# Patient Record
Sex: MALE | Race: OTHER | Employment: OTHER | URBAN - METROPOLITAN AREA
[De-identification: names, ages, dates, MRNs, and addresses within clinical notes are randomized per-mention and may not be internally consistent; named-entity substitution may affect disease eponyms.]

---

## 2024-05-21 ENCOUNTER — TELEPHONE (OUTPATIENT)
Dept: ORTHOPEDIC SURGERY | Age: 69
End: 2024-05-21

## 2024-05-21 ENCOUNTER — OFFICE VISIT (OUTPATIENT)
Dept: ORTHOPEDIC SURGERY | Age: 69
End: 2024-05-21
Payer: MEDICARE

## 2024-05-21 DIAGNOSIS — M21.611 BUNION OF GREAT TOE OF RIGHT FOOT: ICD-10-CM

## 2024-05-21 DIAGNOSIS — M21.621 TAILOR'S BUNIONETTE, RIGHT: ICD-10-CM

## 2024-05-21 DIAGNOSIS — M21.621 TAILOR'S BUNION OF RIGHT FOOT: Primary | ICD-10-CM

## 2024-05-21 PROBLEM — I48.0 PAROXYSMAL ATRIAL FIBRILLATION (HCC): Status: ACTIVE | Noted: 2017-09-04

## 2024-05-21 PROBLEM — R78.81 BACTEREMIA DUE TO STREPTOCOCCUS PNEUMONIAE: Status: ACTIVE | Noted: 2017-09-04

## 2024-05-21 PROBLEM — B95.3 BACTEREMIA DUE TO STREPTOCOCCUS PNEUMONIAE: Status: ACTIVE | Noted: 2017-09-04

## 2024-05-21 PROBLEM — R03.0 FINDING OF ABOVE NORMAL BLOOD PRESSURE: Status: ACTIVE | Noted: 2017-09-04

## 2024-05-21 PROCEDURE — G8427 DOCREV CUR MEDS BY ELIG CLIN: HCPCS | Performed by: ORTHOPAEDIC SURGERY

## 2024-05-21 PROCEDURE — G8421 BMI NOT CALCULATED: HCPCS | Performed by: ORTHOPAEDIC SURGERY

## 2024-05-21 PROCEDURE — 4004F PT TOBACCO SCREEN RCVD TLK: CPT | Performed by: ORTHOPAEDIC SURGERY

## 2024-05-21 PROCEDURE — 3017F COLORECTAL CA SCREEN DOC REV: CPT | Performed by: ORTHOPAEDIC SURGERY

## 2024-05-21 PROCEDURE — 99204 OFFICE O/P NEW MOD 45 MIN: CPT | Performed by: ORTHOPAEDIC SURGERY

## 2024-05-21 PROCEDURE — 1123F ACP DISCUSS/DSCN MKR DOCD: CPT | Performed by: ORTHOPAEDIC SURGERY

## 2024-05-21 PROCEDURE — M5009 MISC POST OP SHOE: HCPCS | Performed by: ORTHOPAEDIC SURGERY

## 2024-05-21 RX ORDER — ASPIRIN 81 MG/1
81 TABLET ORAL DAILY
COMMUNITY
Start: 2017-09-07

## 2024-05-21 RX ORDER — LOSARTAN POTASSIUM 50 MG/1
TABLET ORAL
COMMUNITY
Start: 2024-05-02

## 2024-05-21 RX ORDER — AMLODIPINE BESYLATE 10 MG/1
TABLET ORAL
COMMUNITY
Start: 2024-05-02

## 2024-05-21 RX ORDER — DARUNAVIR 800 MG/1
1 TABLET, FILM COATED ORAL DAILY
COMMUNITY
Start: 2024-02-28

## 2024-05-21 RX ORDER — ELVITEGRAVIR, COBICISTAT, EMTRICITABINE, AND TENOFOVIR ALAFENAMIDE 150; 150; 200; 10 MG/1; MG/1; MG/1; MG/1
1 TABLET ORAL DAILY
COMMUNITY

## 2024-05-21 NOTE — TELEPHONE ENCOUNTER
Pt would like to push his sx back until August. Patient says he is going OOT in July. Please callback

## 2024-05-21 NOTE — PROGRESS NOTES
Name: Corky Bradshaw  YOB: 1955  Gender: male  MRN: [id number]    Summary: bilateral bunionette and right hallux valgus:    Patient to proceed with surgery. Consent to read: right minimally invasive bunionectomy and bunionette    HIV positive    Popliteal saphenous block     CC: bilateral lateral forefoot pain/tenderness    HPI: Corky Bradshaw is a 68 y.o. male Who presents today with lateral forefoot pain and tenderness.  They point over the metatarsal head stating that it is tender to touch.  Shoe wear aggravates that, along with long periods of ambulation.  They deny any acute injuries or prior surgeries.  This is a chronic problem that has slowly become worse over time.  Patient states that he is also having significant bunion pain on his right foot.  He states that his right foot is bothering him more than the left at this time.  He has been using toe spacers for the bunion and bunionette's and would like to discuss his surgical options at this time.    ROS/Meds/PSH/PMH/FH/SH:   Patient Denies fever/chills, headache, visual changes, chest pain, shortness of breath, and nausea/vomiting/diarrhea   Tobacco:  has no history on file for tobacco use.  Diabetes: none  Other: none    Physical Examination:  Gen: AAOx3 NAD  Resp: CTAB - no wheezing  Card: RRR  Eyes: sclera non icteric    right lower and left lower: FROM actively of toes, foot, ankle, knee and hip. No instability of foot or ankle with drawer and stress.  5/5 strength to TA/EHL/GSC/Peroneals/PTib. No skin lesions, There is some TTP over the 5th MT head.  There is a painful palpable bursa in this area over the 5th MT head.    On the right lower extremity: There is valgus alignment of the 1st MTP joint.  The medial eminence is prominent and TTP    normal silverskoid exam: With the hindfoot in neutral and forefoot supinated there is good ankle dorsiflexion with the knee flexed and extended.   Neutral hindfoot alignment.  No cavovarus nor

## 2024-05-22 NOTE — PROGRESS NOTES
The patient was prescribed and fitted with a post op shoe for the right foot, size medium.     Patient read and signed documenting they understand and agree to [facility]'s current DME return policy.

## 2024-05-23 ENCOUNTER — TELEPHONE (OUTPATIENT)
Dept: ORTHOPEDIC SURGERY | Age: 69
End: 2024-05-23

## 2024-07-16 ENCOUNTER — TELEPHONE (OUTPATIENT)
Dept: ORTHOPEDIC SURGERY | Age: 69
End: 2024-07-16

## 2024-10-01 ENCOUNTER — HOSPITAL ENCOUNTER (OUTPATIENT)
Dept: PET IMAGING | Age: 69
Discharge: HOME | End: 2024-10-04
Payer: MEDICARE

## 2024-10-01 DIAGNOSIS — C76.0 MALIGNANT NEOPLASM OF HEAD, FACE, AND NECK (HCC): ICD-10-CM

## 2024-10-01 LAB
GLUCOSE BLD STRIP.AUTO-MCNC: 97 MG/DL (ref 65–100)
SERVICE CMNT-IMP: NORMAL

## 2024-10-01 PROCEDURE — A9609 HC RX DIAGNOSTIC RADIOPHARMACEUTICAL: HCPCS

## 2024-10-01 PROCEDURE — 3430000000 HC RX DIAGNOSTIC RADIOPHARMACEUTICAL

## 2024-10-01 PROCEDURE — 2580000003 HC RX 258

## 2024-10-01 PROCEDURE — 82962 GLUCOSE BLOOD TEST: CPT

## 2024-10-01 PROCEDURE — 6360000004 HC RX CONTRAST MEDICATION

## 2024-10-01 PROCEDURE — 78815 PET IMAGE W/CT SKULL-THIGH: CPT

## 2024-10-01 RX ORDER — SODIUM CHLORIDE 0.9 % (FLUSH) 0.9 %
20 SYRINGE (ML) INJECTION AS NEEDED
Status: DISCONTINUED | OUTPATIENT
Start: 2024-10-01 | End: 2024-10-05 | Stop reason: HOSPADM

## 2024-10-01 RX ORDER — FLUDEOXYGLUCOSE F 18 200 MCI/ML
13.68 INJECTION, SOLUTION INTRAVENOUS
Status: COMPLETED | OUTPATIENT
Start: 2024-10-01 | End: 2024-10-01

## 2024-10-01 RX ORDER — DIATRIZOATE MEGLUMINE AND DIATRIZOATE SODIUM 660; 100 MG/ML; MG/ML
10 SOLUTION ORAL; RECTAL
Status: DISCONTINUED | OUTPATIENT
Start: 2024-10-01 | End: 2024-10-05 | Stop reason: HOSPADM

## 2024-10-01 RX ADMIN — FLUDEOXYGLUCOSE F 18 13.68 MILLICURIE: 200 INJECTION, SOLUTION INTRAVENOUS at 10:17

## 2024-10-01 RX ADMIN — SODIUM CHLORIDE, PRESERVATIVE FREE 20 ML: 5 INJECTION INTRAVENOUS at 10:17

## 2024-10-01 RX ADMIN — DIATRIZOATE MEGLUMINE AND DIATRIZOATE SODIUM 10 ML: 660; 100 LIQUID ORAL; RECTAL at 10:17

## 2024-10-07 ENCOUNTER — TRANSCRIBE ORDERS (OUTPATIENT)
Dept: PHYSICAL THERAPY | Age: 69
End: 2024-10-07

## 2024-10-07 DIAGNOSIS — R13.10 DYSPHAGIA, UNSPECIFIED TYPE: Primary | ICD-10-CM

## 2024-10-08 ENCOUNTER — HOSPITAL ENCOUNTER (OUTPATIENT)
Dept: GENERAL RADIOLOGY | Age: 69
Discharge: HOME | End: 2024-10-11
Payer: MEDICARE

## 2024-10-08 DIAGNOSIS — R13.10 DYSPHAGIA, UNSPECIFIED TYPE: ICD-10-CM

## 2024-10-08 PROCEDURE — 92611 MOTION FLUOROSCOPY/SWALLOW: CPT

## 2024-10-08 PROCEDURE — 74230 X-RAY XM SWLNG FUNCJ C+: CPT

## 2024-10-08 PROCEDURE — 2500000003 HC RX 250 WO HCPCS: Performed by: PHYSICIAN ASSISTANT

## 2024-10-08 RX ADMIN — BARIUM SULFATE 30 ML: 0.81 POWDER, FOR SUSPENSION ORAL at 09:18

## 2024-10-08 RX ADMIN — BARIUM SULFATE 15 ML: 400 PASTE ORAL at 09:18

## 2024-10-08 NOTE — THERAPY EVALUATION
Corky Bradshaw  : 1955  Primary: Medicare Part A And B  Secondary:   MRN: [id number] [facility] RADIOLOGY  [address] [place] [address]  Phone: [phone]    Visit Info:    Date 10/8/2024   SPEECH LANGUAGE PATHOLOGY:   MODIFIED BARIUM SWALLOW STUDY     Appt Desk   Episode      Treatment Diagnosis:    Dysphagia, unspecified type    Medical/Referring Diagnosis:  Dysphagia, unspecified type [R13.10]  Referring Physician:  Caroline Rios PA  Radiologist: Dr. Cardoso  Fluoroscopy completed by: JENNY Hatch MD Orders: Modified Barium Swallow  Date of Onset:  No data recorded   Allergies:  Efavirenz    PAST MEDICAL HISTORY:   Mr. Bradshaw is a 69 y.o. male who  has no past medical history on file.  He also  has no past surgical history on file.  Recently diagnosed with R BOT scca.  Plan for treatment: composite resection, hemiglossectomy, FOM resection, R tonsillectomy, pharyngectomy, RND, possible LND, trach, NGTF (possible PEG), RFF vs ALT flap reconstruction by Angie on 10/24/24 at [facility]    ASSESSMENT/PLAN OF CARE   Based on the objective data described below, Mr. Bradshaw presents with moderate odynophagia resulting in incoordinated swallow function. There is delayed oral transit with lingual pumping with all textures. Patient uses spontaneous head tilt to left with larger bolus of thin liquids resulting in transient laryngeal penetration. Mild vallecular residue after all swallows regardless of consistency that clears with cued double swallow. No aspiration observed.    RECOMMENDATIONS AND PLANNED INTERVENTIONS:  DIET:   pureed as needed for pain. Patient is able to masticate soft solids, but with increasing pain  thin liquids by single sip  MEDICATIONS: as tolerated    Compensatory Swallowing Strategies/Modifications:  Alternate liquids/solids  Small bites and sips  Slow rate of PO intake  Double swallow  Remain upright for 20-30 min after any PO    Additional

## 2024-11-07 PROBLEM — C02.9 PRIMARY TONGUE SQUAMOUS CELL CARCINOMA (HCC): Status: ACTIVE | Noted: 2024-11-07

## 2024-12-02 ENCOUNTER — APPOINTMENT (OUTPATIENT)
Dept: CT IMAGING | Age: 69
DRG: 975 | End: 2024-12-02
Payer: MEDICARE

## 2024-12-02 ENCOUNTER — HOSPITAL ENCOUNTER (INPATIENT)
Age: 69
LOS: 4 days | Discharge: HOME HEALTH | DRG: 975 | End: 2024-12-06
Attending: STUDENT IN AN ORGANIZED HEALTH CARE EDUCATION/TRAINING PROGRAM | Admitting: FAMILY MEDICINE
Payer: MEDICARE

## 2024-12-02 ENCOUNTER — APPOINTMENT (OUTPATIENT)
Dept: GENERAL RADIOLOGY | Age: 69
DRG: 975 | End: 2024-12-02
Payer: MEDICARE

## 2024-12-02 DIAGNOSIS — M54.50 MIDLINE LOW BACK PAIN WITHOUT SCIATICA, UNSPECIFIED CHRONICITY: ICD-10-CM

## 2024-12-02 DIAGNOSIS — A41.9 SEPTICEMIA (HCC): Primary | ICD-10-CM

## 2024-12-02 PROBLEM — E87.1 HYPONATREMIA: Status: ACTIVE | Noted: 2024-12-02

## 2024-12-02 PROBLEM — E87.20 LACTIC ACIDOSIS: Status: ACTIVE | Noted: 2024-12-02

## 2024-12-02 PROBLEM — N39.0 UTI (URINARY TRACT INFECTION): Status: ACTIVE | Noted: 2024-12-02

## 2024-12-02 PROBLEM — D72.829 LEUKOCYTOSIS: Status: ACTIVE | Noted: 2024-12-02

## 2024-12-02 PROBLEM — D64.9 NORMOCYTIC ANEMIA: Status: ACTIVE | Noted: 2024-12-02

## 2024-12-02 LAB
ALBUMIN SERPL-MCNC: 2.9 G/DL (ref 3.2–4.6)
ALBUMIN/GLOB SERPL: 0.6 (ref 1–1.9)
ALP SERPL-CCNC: 106 U/L (ref 40–129)
ALT SERPL-CCNC: 31 U/L (ref 8–55)
ANION GAP SERPL CALC-SCNC: 24 MMOL/L (ref 7–16)
APPEARANCE UR: ABNORMAL
AST SERPL-CCNC: 35 U/L (ref 15–37)
BACTERIA URNS QL MICRO: ABNORMAL /HPF
BASOPHILS # BLD: 0 K/UL (ref 0–0.2)
BASOPHILS NFR BLD: 0 % (ref 0–2)
BILIRUB SERPL-MCNC: 0.5 MG/DL (ref 0–1.2)
BILIRUB UR QL: NEGATIVE
BUN SERPL-MCNC: 22 MG/DL (ref 8–23)
CALCIUM SERPL-MCNC: 9.2 MG/DL (ref 8.8–10.2)
CASTS URNS QL MICRO: ABNORMAL /LPF
CHLORIDE SERPL-SCNC: 87 MMOL/L (ref 98–107)
CO2 SERPL-SCNC: 21 MMOL/L (ref 20–29)
COLOR UR: ABNORMAL
CREAT SERPL-MCNC: 0.99 MG/DL (ref 0.8–1.3)
DIFFERENTIAL METHOD BLD: ABNORMAL
EOSINOPHIL # BLD: 0 K/UL (ref 0–0.8)
EOSINOPHIL NFR BLD: 0 % (ref 0.5–7.8)
EPI CELLS #/AREA URNS HPF: ABNORMAL /HPF
ERYTHROCYTE [DISTWIDTH] IN BLOOD BY AUTOMATED COUNT: 14.6 % (ref 11.9–14.6)
GLOBULIN SER CALC-MCNC: 4.7 G/DL (ref 2.3–3.5)
GLUCOSE SERPL-MCNC: 127 MG/DL (ref 70–99)
GLUCOSE UR STRIP.AUTO-MCNC: NEGATIVE MG/DL
HCT VFR BLD AUTO: 29.5 % (ref 41.1–50.3)
HGB BLD-MCNC: 9.8 G/DL (ref 13.6–17.2)
HGB UR QL STRIP: NEGATIVE
IMM GRANULOCYTES # BLD AUTO: 0.1 K/UL (ref 0–0.5)
IMM GRANULOCYTES NFR BLD AUTO: 1 % (ref 0–5)
KETONES UR QL STRIP.AUTO: ABNORMAL MG/DL
LACTATE SERPL-SCNC: 2.2 MMOL/L (ref 0.5–2)
LACTATE SERPL-SCNC: 9.7 MMOL/L (ref 0.5–2)
LEUKOCYTE ESTERASE UR QL STRIP.AUTO: ABNORMAL
LYMPHOCYTES # BLD: 2 K/UL (ref 0.5–4.6)
LYMPHOCYTES NFR BLD: 13 % (ref 13–44)
MCH RBC QN AUTO: 27.4 PG (ref 26.1–32.9)
MCHC RBC AUTO-ENTMCNC: 33.2 G/DL (ref 31.4–35)
MCV RBC AUTO: 82.4 FL (ref 82–102)
MONOCYTES # BLD: 0.7 K/UL (ref 0.1–1.3)
MONOCYTES NFR BLD: 5 % (ref 4–12)
NEUTS SEG # BLD: 12.8 K/UL (ref 1.7–8.2)
NEUTS SEG NFR BLD: 82 % (ref 43–78)
NITRITE UR QL STRIP.AUTO: NEGATIVE
NRBC # BLD: 0 K/UL (ref 0–0.2)
PH UR STRIP: 5.5 (ref 5–9)
PLATELET # BLD AUTO: 446 K/UL (ref 150–450)
PMV BLD AUTO: 9.1 FL (ref 9.4–12.3)
POTASSIUM SERPL-SCNC: 4.5 MMOL/L (ref 3.5–5.1)
PROCALCITONIN SERPL-MCNC: 0.45 NG/ML (ref 0–0.1)
PROT SERPL-MCNC: 7.5 G/DL (ref 6.3–8.2)
PROT UR STRIP-MCNC: 100 MG/DL
RBC # BLD AUTO: 3.58 M/UL (ref 4.23–5.6)
SARS-COV-2 RDRP RESP QL NAA+PROBE: NOT DETECTED
SODIUM SERPL-SCNC: 132 MMOL/L (ref 136–145)
SOURCE: NORMAL
SP GR UR REFRACTOMETRY: 1.03 (ref 1–1.02)
UROBILINOGEN UR QL STRIP.AUTO: 1 EU/DL (ref 0.2–1)
WBC # BLD AUTO: 15.7 K/UL (ref 4.3–11.1)
WBC URNS QL MICRO: ABNORMAL /HPF

## 2024-12-02 PROCEDURE — 71260 CT THORAX DX C+: CPT

## 2024-12-02 PROCEDURE — 70491 CT SOFT TISSUE NECK W/DYE: CPT

## 2024-12-02 PROCEDURE — 6360000002 HC RX W HCPCS: Performed by: STUDENT IN AN ORGANIZED HEALTH CARE EDUCATION/TRAINING PROGRAM

## 2024-12-02 PROCEDURE — 85025 COMPLETE CBC W/AUTO DIFF WBC: CPT

## 2024-12-02 PROCEDURE — 81001 URINALYSIS AUTO W/SCOPE: CPT

## 2024-12-02 PROCEDURE — 80053 COMPREHEN METABOLIC PANEL: CPT

## 2024-12-02 PROCEDURE — 99285 EMERGENCY DEPT VISIT HI MDM: CPT

## 2024-12-02 PROCEDURE — 96375 TX/PRO/DX INJ NEW DRUG ADDON: CPT

## 2024-12-02 PROCEDURE — 87086 URINE CULTURE/COLONY COUNT: CPT

## 2024-12-02 PROCEDURE — 84145 PROCALCITONIN (PCT): CPT

## 2024-12-02 PROCEDURE — 87040 BLOOD CULTURE FOR BACTERIA: CPT

## 2024-12-02 PROCEDURE — 93005 ELECTROCARDIOGRAM TRACING: CPT | Performed by: STUDENT IN AN ORGANIZED HEALTH CARE EDUCATION/TRAINING PROGRAM

## 2024-12-02 PROCEDURE — 2580000003 HC RX 258: Performed by: PHYSICIAN ASSISTANT

## 2024-12-02 PROCEDURE — 96365 THER/PROPH/DIAG IV INF INIT: CPT

## 2024-12-02 PROCEDURE — 87154 CUL TYP ID BLD PTHGN 6+ TRGT: CPT

## 2024-12-02 PROCEDURE — 6370000000 HC RX 637 (ALT 250 FOR IP): Performed by: PHYSICIAN ASSISTANT

## 2024-12-02 PROCEDURE — 83605 ASSAY OF LACTIC ACID: CPT

## 2024-12-02 PROCEDURE — 6360000004 HC RX CONTRAST MEDICATION: Performed by: PHYSICIAN ASSISTANT

## 2024-12-02 PROCEDURE — 1100000000 HC RM PRIVATE

## 2024-12-02 PROCEDURE — 87635 SARS-COV-2 COVID-19 AMP PRB: CPT

## 2024-12-02 PROCEDURE — 71046 X-RAY EXAM CHEST 2 VIEWS: CPT

## 2024-12-02 PROCEDURE — 2580000003 HC RX 258: Performed by: STUDENT IN AN ORGANIZED HEALTH CARE EDUCATION/TRAINING PROGRAM

## 2024-12-02 PROCEDURE — 87205 SMEAR GRAM STAIN: CPT

## 2024-12-02 PROCEDURE — 96361 HYDRATE IV INFUSION ADD-ON: CPT

## 2024-12-02 RX ORDER — MAGNESIUM SULFATE IN WATER 40 MG/ML
2000 INJECTION, SOLUTION INTRAVENOUS PRN
Status: DISCONTINUED | OUTPATIENT
Start: 2024-12-02 | End: 2024-12-06 | Stop reason: HOSPADM

## 2024-12-02 RX ORDER — ACETAMINOPHEN 650 MG/1
650 SUPPOSITORY RECTAL EVERY 6 HOURS PRN
Status: DISCONTINUED | OUTPATIENT
Start: 2024-12-02 | End: 2024-12-06 | Stop reason: HOSPADM

## 2024-12-02 RX ORDER — SODIUM CHLORIDE, SODIUM LACTATE, POTASSIUM CHLORIDE, AND CALCIUM CHLORIDE .6; .31; .03; .02 G/100ML; G/100ML; G/100ML; G/100ML
1000 INJECTION, SOLUTION INTRAVENOUS
Status: COMPLETED | OUTPATIENT
Start: 2024-12-02 | End: 2024-12-02

## 2024-12-02 RX ORDER — ENOXAPARIN SODIUM 100 MG/ML
40 INJECTION SUBCUTANEOUS DAILY
Status: DISCONTINUED | OUTPATIENT
Start: 2024-12-03 | End: 2024-12-06 | Stop reason: HOSPADM

## 2024-12-02 RX ORDER — GABAPENTIN 100 MG/1
100 CAPSULE ORAL 2 TIMES DAILY
Status: DISCONTINUED | OUTPATIENT
Start: 2024-12-02 | End: 2024-12-03

## 2024-12-02 RX ORDER — SULFAMETHOXAZOLE AND TRIMETHOPRIM 200; 40 MG/5ML; MG/5ML
160 SUSPENSION ORAL
Status: DISCONTINUED | OUTPATIENT
Start: 2024-12-04 | End: 2024-12-03 | Stop reason: SDUPTHER

## 2024-12-02 RX ORDER — DARUNAVIR 800 MG/1
1 TABLET, FILM COATED ORAL DAILY
Status: DISCONTINUED | OUTPATIENT
Start: 2024-12-03 | End: 2024-12-03

## 2024-12-02 RX ORDER — LINEZOLID 2 MG/ML
600 INJECTION, SOLUTION INTRAVENOUS
Status: COMPLETED | OUTPATIENT
Start: 2024-12-02 | End: 2024-12-02

## 2024-12-02 RX ORDER — ATORVASTATIN CALCIUM 40 MG/1
40 TABLET, FILM COATED ORAL DAILY
Status: DISCONTINUED | OUTPATIENT
Start: 2024-12-03 | End: 2024-12-03

## 2024-12-02 RX ORDER — ACETAMINOPHEN 160 MG/5ML
975 SUSPENSION ORAL
Status: COMPLETED | OUTPATIENT
Start: 2024-12-02 | End: 2024-12-02

## 2024-12-02 RX ORDER — 0.9 % SODIUM CHLORIDE 0.9 %
1000 INTRAVENOUS SOLUTION INTRAVENOUS
Status: COMPLETED | OUTPATIENT
Start: 2024-12-02 | End: 2024-12-02

## 2024-12-02 RX ORDER — ASPIRIN 81 MG/1
81 TABLET ORAL DAILY
Status: DISCONTINUED | OUTPATIENT
Start: 2024-12-03 | End: 2024-12-03

## 2024-12-02 RX ORDER — IOPAMIDOL 755 MG/ML
100 INJECTION, SOLUTION INTRAVASCULAR
Status: COMPLETED | OUTPATIENT
Start: 2024-12-02 | End: 2024-12-02

## 2024-12-02 RX ORDER — SODIUM CHLORIDE 9 MG/ML
INJECTION, SOLUTION INTRAVENOUS PRN
Status: DISCONTINUED | OUTPATIENT
Start: 2024-12-02 | End: 2024-12-06 | Stop reason: HOSPADM

## 2024-12-02 RX ORDER — LOSARTAN POTASSIUM 50 MG/1
50 TABLET ORAL DAILY
Status: DISCONTINUED | OUTPATIENT
Start: 2024-12-03 | End: 2024-12-03

## 2024-12-02 RX ORDER — POTASSIUM CHLORIDE 1500 MG/1
40 TABLET, EXTENDED RELEASE ORAL PRN
Status: DISCONTINUED | OUTPATIENT
Start: 2024-12-02 | End: 2024-12-03

## 2024-12-02 RX ORDER — POLYETHYLENE GLYCOL 3350 17 G/17G
17 POWDER, FOR SOLUTION ORAL DAILY PRN
Status: DISCONTINUED | OUTPATIENT
Start: 2024-12-02 | End: 2024-12-06 | Stop reason: HOSPADM

## 2024-12-02 RX ORDER — POTASSIUM CHLORIDE 7.45 MG/ML
10 INJECTION INTRAVENOUS PRN
Status: DISCONTINUED | OUTPATIENT
Start: 2024-12-02 | End: 2024-12-03

## 2024-12-02 RX ORDER — ACYCLOVIR 800 MG/1
800 TABLET ORAL DAILY
Status: DISCONTINUED | OUTPATIENT
Start: 2024-12-03 | End: 2024-12-03

## 2024-12-02 RX ORDER — ONDANSETRON 4 MG/1
4 TABLET, ORALLY DISINTEGRATING ORAL EVERY 8 HOURS PRN
Status: DISCONTINUED | OUTPATIENT
Start: 2024-12-02 | End: 2024-12-06 | Stop reason: HOSPADM

## 2024-12-02 RX ORDER — ONDANSETRON 2 MG/ML
4 INJECTION INTRAMUSCULAR; INTRAVENOUS EVERY 6 HOURS PRN
Status: DISCONTINUED | OUTPATIENT
Start: 2024-12-02 | End: 2024-12-06 | Stop reason: HOSPADM

## 2024-12-02 RX ORDER — AMLODIPINE BESYLATE 5 MG/1
5 TABLET ORAL DAILY
Status: DISCONTINUED | OUTPATIENT
Start: 2024-12-03 | End: 2024-12-03

## 2024-12-02 RX ORDER — ACETAMINOPHEN 325 MG/1
650 TABLET ORAL EVERY 6 HOURS PRN
Status: DISCONTINUED | OUTPATIENT
Start: 2024-12-02 | End: 2024-12-06 | Stop reason: HOSPADM

## 2024-12-02 RX ORDER — SODIUM CHLORIDE 9 MG/ML
INJECTION, SOLUTION INTRAVENOUS CONTINUOUS
Status: ACTIVE | OUTPATIENT
Start: 2024-12-02 | End: 2024-12-03

## 2024-12-02 RX ORDER — SODIUM CHLORIDE 0.9 % (FLUSH) 0.9 %
5-40 SYRINGE (ML) INJECTION PRN
Status: DISCONTINUED | OUTPATIENT
Start: 2024-12-02 | End: 2024-12-06 | Stop reason: HOSPADM

## 2024-12-02 RX ORDER — SODIUM CHLORIDE 0.9 % (FLUSH) 0.9 %
5-40 SYRINGE (ML) INJECTION EVERY 12 HOURS SCHEDULED
Status: DISCONTINUED | OUTPATIENT
Start: 2024-12-02 | End: 2024-12-06 | Stop reason: HOSPADM

## 2024-12-02 RX ADMIN — SODIUM CHLORIDE, POTASSIUM CHLORIDE, SODIUM LACTATE AND CALCIUM CHLORIDE 1000 ML: 600; 310; 30; 20 INJECTION, SOLUTION INTRAVENOUS at 18:19

## 2024-12-02 RX ADMIN — IOPAMIDOL 100 ML: 755 INJECTION, SOLUTION INTRAVENOUS at 18:51

## 2024-12-02 RX ADMIN — CEFEPIME 2000 MG: 2 INJECTION, POWDER, FOR SOLUTION INTRAVENOUS at 17:37

## 2024-12-02 RX ADMIN — LINEZOLID 600 MG: 600 INJECTION, SOLUTION INTRAVENOUS at 17:43

## 2024-12-02 RX ADMIN — ACETAMINOPHEN 975 MG: 325 SUSPENSION ORAL at 17:12

## 2024-12-02 RX ADMIN — SODIUM CHLORIDE 1000 ML: 9 INJECTION, SOLUTION INTRAVENOUS at 17:12

## 2024-12-02 ASSESSMENT — PAIN SCALES - GENERAL
PAINLEVEL_OUTOF10: 0
PAINLEVEL_OUTOF10: 8

## 2024-12-02 ASSESSMENT — PAIN - FUNCTIONAL ASSESSMENT: PAIN_FUNCTIONAL_ASSESSMENT: 0-10

## 2024-12-02 NOTE — ED TRIAGE NOTES
Patient presents to the ER with son> patient son states patient has had shivering and headaches for the past few days. Patient son states patient recently had mouth surgery.

## 2024-12-02 NOTE — ED PROVIDER NOTES
Emergency Department Provider Note       PCP: Casey Holland MD   Age: 69 y.o.   Sex: male     DISPOSITION    No diagnosis found.    Medical Decision Making     Corky Bradshaw is a 69-year-old male with a significant past medical history of HIV, lymphoma, oropharyngeal cancer with prior resection.  He has a 3 to 4-day history of uncontrollable chills, subjective fever, nausea and vomiting.  On 11/11/2024 he had an acute airway obstruction with admission.  He notes that he had oropharyngeal cancer which was removed surgically and is supposed to initiate chemotherapy radiation in the future.  Due to a high clinical sternum for patient deterioration sepsis protocol was initiated.  Urinalysis revealed likely UTI.  CBC revealed an elevated white blood cell count at 15.7 with increased respirations and given his HIV, lymphoma, oropharyngeal surgery source of infection was likely.  A lactate was initially obtained and was 9.7 and eventually subsided to 2.2 after fluid resuscitation.  As procalcitonin was also elevated at 0.45.  Broad-spectrum antibiotics including linezolid and cefepime were initiated.  After vitals were stabilized, patient was able to tolerate oral fluids, and lactate had drastically improved hospitalist was consulted for admission.           1 chronic illness with exacerbation.  Discussion with external consultants.  Chronic medical problems impacting care include HIV, lymphoma, oropharyngeal cancer.  Shared medical decision making was utilized in creating the patients health plan today.  I independently ordered and reviewed each unique test.    I reviewed external records: provider visit note from PCP.  I reviewed external records: provider visit note from outside specialist.   The patients assessment required an independent historian: son.  The reason they were needed is important historical information not provided by the patient.  ED cardiac monitoring rhythm strip was ordered and interpreted:

## 2024-12-03 PROBLEM — A41.9 SEVERE SEPSIS (HCC): Status: ACTIVE | Noted: 2024-12-03

## 2024-12-03 PROBLEM — R65.20 SEVERE SEPSIS (HCC): Status: ACTIVE | Noted: 2024-12-03

## 2024-12-03 LAB
ACB COMPLEX DNA BLD POS QL NAA+NON-PROBE: NOT DETECTED
ACCESSION NUMBER, LLC1M: ABNORMAL
ALBUMIN SERPL-MCNC: 2 G/DL (ref 3.2–4.6)
ALBUMIN/GLOB SERPL: 0.6 (ref 1–1.9)
ALP SERPL-CCNC: 88 U/L (ref 40–129)
ALT SERPL-CCNC: 30 U/L (ref 8–55)
ANION GAP SERPL CALC-SCNC: 11 MMOL/L (ref 7–16)
AST SERPL-CCNC: 31 U/L (ref 15–37)
B FRAGILIS DNA BLD POS QL NAA+NON-PROBE: NOT DETECTED
BASOPHILS # BLD: 0 K/UL (ref 0–0.2)
BASOPHILS NFR BLD: 0 % (ref 0–2)
BILIRUB DIRECT SERPL-MCNC: <0.2 MG/DL (ref 0–0.4)
BILIRUB INDIRECT SERPL-MCNC: NORMAL MG/DL (ref 0–1.1)
BILIRUB SERPL-MCNC: 0.3 MG/DL (ref 0–1.2)
BILIRUB SERPL-MCNC: 0.4 MG/DL (ref 0–1.2)
BIOFIRE TEST COMMENT: ABNORMAL
BUN SERPL-MCNC: 19 MG/DL (ref 8–23)
C ALBICANS DNA BLD POS QL NAA+NON-PROBE: NOT DETECTED
C AURIS DNA BLD POS QL NAA+NON-PROBE: NOT DETECTED
C GATTII+NEOFOR DNA BLD POS QL NAA+N-PRB: NOT DETECTED
C GLABRATA DNA BLD POS QL NAA+NON-PROBE: NOT DETECTED
C KRUSEI DNA BLD POS QL NAA+NON-PROBE: NOT DETECTED
C PARAP DNA BLD POS QL NAA+NON-PROBE: NOT DETECTED
C TROPICLS DNA BLD POS QL NAA+NON-PROBE: NOT DETECTED
CALCIUM SERPL-MCNC: 8.3 MG/DL (ref 8.8–10.2)
CHLORIDE SERPL-SCNC: 92 MMOL/L (ref 98–107)
CO2 SERPL-SCNC: 25 MMOL/L (ref 20–29)
CREAT SERPL-MCNC: 0.68 MG/DL (ref 0.8–1.3)
DIFFERENTIAL METHOD BLD: ABNORMAL
E CLOAC COMP DNA BLD POS NAA+NON-PROBE: NOT DETECTED
E COLI DNA BLD POS QL NAA+NON-PROBE: NOT DETECTED
E FAECALIS DNA BLD POS QL NAA+NON-PROBE: NOT DETECTED
E FAECIUM DNA BLD POS QL NAA+NON-PROBE: NOT DETECTED
EKG ATRIAL RATE: 119 BPM
EKG ATRIAL RATE: 129 BPM
EKG DIAGNOSIS: NORMAL
EKG DIAGNOSIS: NORMAL
EKG P AXIS: 66 DEGREES
EKG P-R INTERVAL: 136 MS
EKG Q-T INTERVAL: 280 MS
EKG Q-T INTERVAL: 336 MS
EKG QRS DURATION: 76 MS
EKG QRS DURATION: 81 MS
EKG QTC CALCULATION (BAZETT): 417 MS
EKG QTC CALCULATION (BAZETT): 472 MS
EKG R AXIS: -57 DEGREES
EKG R AXIS: 218 DEGREES
EKG T AXIS: 49 DEGREES
EKG T AXIS: 57 DEGREES
EKG VENTRICULAR RATE: 119 BPM
EKG VENTRICULAR RATE: 133 BPM
ENTEROBACTERALES DNA BLD POS NAA+N-PRB: NOT DETECTED
EOSINOPHIL # BLD: 0 K/UL (ref 0–0.8)
EOSINOPHIL NFR BLD: 0 % (ref 0.5–7.8)
ERYTHROCYTE [DISTWIDTH] IN BLOOD BY AUTOMATED COUNT: 14.6 % (ref 11.9–14.6)
FERRITIN SERPL-MCNC: 464 NG/ML (ref 8–388)
FOLATE SERPL-MCNC: 14.3 NG/ML (ref 3.1–17.5)
GLOBULIN SER CALC-MCNC: 3.3 G/DL (ref 2.3–3.5)
GLUCOSE BLD STRIP.AUTO-MCNC: 128 MG/DL (ref 65–100)
GLUCOSE BLD STRIP.AUTO-MCNC: 97 MG/DL (ref 65–100)
GLUCOSE SERPL-MCNC: 138 MG/DL (ref 70–99)
GP B STREP DNA BLD POS QL NAA+NON-PROBE: NOT DETECTED
HAEM INFLU DNA BLD POS QL NAA+NON-PROBE: NOT DETECTED
HCT VFR BLD AUTO: 23.7 % (ref 41.1–50.3)
HGB BLD-MCNC: 8.1 G/DL (ref 13.6–17.2)
HGB RETIC QN AUTO: 23 PG (ref 29–35)
IMM GRANULOCYTES # BLD AUTO: 0.1 K/UL (ref 0–0.5)
IMM GRANULOCYTES NFR BLD AUTO: 1 % (ref 0–5)
IMM RETICS NFR: 9.9 % (ref 2.3–13.4)
IRON SATN MFR SERPL: 5 % (ref 20–50)
IRON SERPL-MCNC: 10 UG/DL (ref 35–100)
K OXYTOCA DNA BLD POS QL NAA+NON-PROBE: NOT DETECTED
KLEBSIELLA SP DNA BLD POS QL NAA+NON-PRB: NOT DETECTED
KLEBSIELLA SP DNA BLD POS QL NAA+NON-PRB: NOT DETECTED
L MONOCYTOG DNA BLD POS QL NAA+NON-PROBE: NOT DETECTED
LACTATE SERPL-SCNC: 0.8 MMOL/L (ref 0.5–2)
LYMPHOCYTES # BLD: 0.7 K/UL (ref 0.5–4.6)
LYMPHOCYTES NFR BLD: 5 % (ref 13–44)
MCH RBC QN AUTO: 27.3 PG (ref 26.1–32.9)
MCHC RBC AUTO-ENTMCNC: 34.2 G/DL (ref 31.4–35)
MCV RBC AUTO: 79.8 FL (ref 82–102)
MONOCYTES # BLD: 1.3 K/UL (ref 0.1–1.3)
MONOCYTES NFR BLD: 10 % (ref 4–12)
N MEN DNA BLD POS QL NAA+NON-PROBE: NOT DETECTED
NEUTS SEG # BLD: 11.1 K/UL (ref 1.7–8.2)
NEUTS SEG NFR BLD: 84 % (ref 43–78)
NRBC # BLD: 0 K/UL (ref 0–0.2)
P AERUGINOSA DNA BLD POS NAA+NON-PROBE: NOT DETECTED
PLATELET # BLD AUTO: 339 K/UL (ref 150–450)
PMV BLD AUTO: 9.2 FL (ref 9.4–12.3)
POTASSIUM SERPL-SCNC: 3.7 MMOL/L (ref 3.5–5.1)
PROT SERPL-MCNC: 5.3 G/DL (ref 6.3–8.2)
PROTEUS SP DNA BLD POS QL NAA+NON-PROBE: NOT DETECTED
RBC # BLD AUTO: 2.97 M/UL (ref 4.23–5.6)
RESISTANT GENE TARGETS: ABNORMAL
RETICS # AUTO: 0.02 M/UL (ref 0.03–0.1)
RETICS/RBC NFR AUTO: 0.8 % (ref 0.3–2)
S AUREUS DNA BLD POS QL NAA+NON-PROBE: NOT DETECTED
S AUREUS+CONS DNA BLD POS NAA+NON-PROBE: NOT DETECTED
S EPIDERMIDIS DNA BLD POS QL NAA+NON-PRB: NOT DETECTED
S LUGDUNENSIS DNA BLD POS QL NAA+NON-PRB: NOT DETECTED
S MALTOPHILIA DNA BLD POS QL NAA+NON-PRB: NOT DETECTED
S MARCESCENS DNA BLD POS NAA+NON-PROBE: NOT DETECTED
S PNEUM DNA BLD POS QL NAA+NON-PROBE: NOT DETECTED
S PYO DNA BLD POS QL NAA+NON-PROBE: NOT DETECTED
SALMONELLA DNA BLD POS QL NAA+NON-PROBE: NOT DETECTED
SERVICE CMNT-IMP: ABNORMAL
SERVICE CMNT-IMP: NORMAL
SODIUM SERPL-SCNC: 128 MMOL/L (ref 136–145)
STREPTOCOCCUS DNA BLD POS NAA+NON-PROBE: DETECTED
TIBC SERPL-MCNC: 201 UG/DL (ref 240–450)
UIBC SERPL-MCNC: 191 UG/DL (ref 112–347)
VIT B12 SERPL-MCNC: 1495 PG/ML (ref 193–986)
WBC # BLD AUTO: 13.2 K/UL (ref 4.3–11.1)

## 2024-12-03 PROCEDURE — 82248 BILIRUBIN DIRECT: CPT

## 2024-12-03 PROCEDURE — 2580000003 HC RX 258: Performed by: HOSPITALIST

## 2024-12-03 PROCEDURE — 85046 RETICYTE/HGB CONCENTRATE: CPT

## 2024-12-03 PROCEDURE — 83605 ASSAY OF LACTIC ACID: CPT

## 2024-12-03 PROCEDURE — 82746 ASSAY OF FOLIC ACID SERUM: CPT

## 2024-12-03 PROCEDURE — 36415 COLL VENOUS BLD VENIPUNCTURE: CPT

## 2024-12-03 PROCEDURE — 93010 ELECTROCARDIOGRAM REPORT: CPT | Performed by: INTERNAL MEDICINE

## 2024-12-03 PROCEDURE — 83540 ASSAY OF IRON: CPT

## 2024-12-03 PROCEDURE — 6360000002 HC RX W HCPCS: Performed by: HOSPITALIST

## 2024-12-03 PROCEDURE — 93005 ELECTROCARDIOGRAM TRACING: CPT | Performed by: HOSPITALIST

## 2024-12-03 PROCEDURE — 80053 COMPREHEN METABOLIC PANEL: CPT

## 2024-12-03 PROCEDURE — 83550 IRON BINDING TEST: CPT

## 2024-12-03 PROCEDURE — 6370000000 HC RX 637 (ALT 250 FOR IP): Performed by: FAMILY MEDICINE

## 2024-12-03 PROCEDURE — 82525 ASSAY OF COPPER: CPT

## 2024-12-03 PROCEDURE — 97535 SELF CARE MNGMENT TRAINING: CPT

## 2024-12-03 PROCEDURE — 1100000000 HC RM PRIVATE

## 2024-12-03 PROCEDURE — 82728 ASSAY OF FERRITIN: CPT

## 2024-12-03 PROCEDURE — 84630 ASSAY OF ZINC: CPT

## 2024-12-03 PROCEDURE — 97165 OT EVAL LOW COMPLEX 30 MIN: CPT

## 2024-12-03 PROCEDURE — 6370000000 HC RX 637 (ALT 250 FOR IP): Performed by: HOSPITALIST

## 2024-12-03 PROCEDURE — 82247 BILIRUBIN TOTAL: CPT

## 2024-12-03 PROCEDURE — 6360000002 HC RX W HCPCS: Performed by: FAMILY MEDICINE

## 2024-12-03 PROCEDURE — 97161 PT EVAL LOW COMPLEX 20 MIN: CPT

## 2024-12-03 PROCEDURE — 97530 THERAPEUTIC ACTIVITIES: CPT

## 2024-12-03 PROCEDURE — 82962 GLUCOSE BLOOD TEST: CPT

## 2024-12-03 PROCEDURE — 82607 VITAMIN B-12: CPT

## 2024-12-03 PROCEDURE — 83010 ASSAY OF HAPTOGLOBIN QUANT: CPT

## 2024-12-03 PROCEDURE — 2580000003 HC RX 258: Performed by: FAMILY MEDICINE

## 2024-12-03 PROCEDURE — 99222 1ST HOSP IP/OBS MODERATE 55: CPT | Performed by: INTERNAL MEDICINE

## 2024-12-03 PROCEDURE — 85025 COMPLETE CBC W/AUTO DIFF WBC: CPT

## 2024-12-03 RX ORDER — SULFAMETHOXAZOLE AND TRIMETHOPRIM 200; 40 MG/5ML; MG/5ML
160 SUSPENSION ORAL
Status: DISCONTINUED | OUTPATIENT
Start: 2024-12-04 | End: 2024-12-06 | Stop reason: HOSPADM

## 2024-12-03 RX ORDER — POTASSIUM CHLORIDE 7.45 MG/ML
10 INJECTION INTRAVENOUS PRN
Status: DISCONTINUED | OUTPATIENT
Start: 2024-12-03 | End: 2024-12-06 | Stop reason: HOSPADM

## 2024-12-03 RX ORDER — CARVEDILOL 6.25 MG/1
3.12 TABLET ORAL 2 TIMES DAILY WITH MEALS
Status: DISCONTINUED | OUTPATIENT
Start: 2024-12-03 | End: 2024-12-06 | Stop reason: HOSPADM

## 2024-12-03 RX ORDER — POTASSIUM CHLORIDE 1500 MG/1
40 TABLET, EXTENDED RELEASE ORAL PRN
Status: DISCONTINUED | OUTPATIENT
Start: 2024-12-03 | End: 2024-12-06 | Stop reason: HOSPADM

## 2024-12-03 RX ORDER — MIDODRINE HYDROCHLORIDE 5 MG/1
5 TABLET ORAL
Status: DISCONTINUED | OUTPATIENT
Start: 2024-12-03 | End: 2024-12-06

## 2024-12-03 RX ADMIN — CARVEDILOL 3.12 MG: 6.25 TABLET, FILM COATED ORAL at 16:12

## 2024-12-03 RX ADMIN — AMPICILLIN SODIUM AND SULBACTAM SODIUM 3000 MG: 2; 1 INJECTION, POWDER, FOR SOLUTION INTRAMUSCULAR; INTRAVENOUS at 05:19

## 2024-12-03 RX ADMIN — MIDODRINE HYDROCHLORIDE 5 MG: 5 TABLET ORAL at 12:35

## 2024-12-03 RX ADMIN — CEFEPIME 2000 MG: 2 INJECTION, POWDER, FOR SOLUTION INTRAVENOUS at 20:51

## 2024-12-03 RX ADMIN — SODIUM CHLORIDE: 9 INJECTION, SOLUTION INTRAVENOUS at 00:01

## 2024-12-03 RX ADMIN — AMPICILLIN SODIUM AND SULBACTAM SODIUM 3000 MG: 2; 1 INJECTION, POWDER, FOR SOLUTION INTRAMUSCULAR; INTRAVENOUS at 00:03

## 2024-12-03 RX ADMIN — DAPTOMYCIN 500 MG: 500 INJECTION, POWDER, LYOPHILIZED, FOR SOLUTION INTRAVENOUS at 16:10

## 2024-12-03 RX ADMIN — SODIUM CHLORIDE: 9 INJECTION, SOLUTION INTRAVENOUS at 16:10

## 2024-12-03 RX ADMIN — MIDODRINE HYDROCHLORIDE 5 MG: 5 TABLET ORAL at 16:13

## 2024-12-03 RX ADMIN — Medication 30 G: at 12:42

## 2024-12-03 RX ADMIN — ENOXAPARIN SODIUM 40 MG: 100 INJECTION SUBCUTANEOUS at 08:18

## 2024-12-03 RX ADMIN — MIDODRINE HYDROCHLORIDE 5 MG: 5 TABLET ORAL at 08:12

## 2024-12-03 RX ADMIN — CARVEDILOL 3.12 MG: 6.25 TABLET, FILM COATED ORAL at 08:12

## 2024-12-03 RX ADMIN — SODIUM CHLORIDE, PRESERVATIVE FREE 10 ML: 5 INJECTION INTRAVENOUS at 01:06

## 2024-12-03 RX ADMIN — ACETAMINOPHEN 650 MG: 325 TABLET, FILM COATED ORAL at 02:02

## 2024-12-03 RX ADMIN — AMPICILLIN SODIUM AND SULBACTAM SODIUM 3000 MG: 2; 1 INJECTION, POWDER, FOR SOLUTION INTRAMUSCULAR; INTRAVENOUS at 11:23

## 2024-12-03 ASSESSMENT — PAIN DESCRIPTION - ORIENTATION: ORIENTATION: MID

## 2024-12-03 ASSESSMENT — PAIN DESCRIPTION - LOCATION: LOCATION: ABDOMEN;HEAD

## 2024-12-03 ASSESSMENT — PAIN SCALES - GENERAL
PAINLEVEL_OUTOF10: 5
PAINLEVEL_OUTOF10: 0

## 2024-12-03 NOTE — PROGRESS NOTES
ACUTE PHYSICAL THERAPY GOALS:   (Developed with and agreed upon by patient and/or caregiver.)    LTG:  (1.)Mr. Bradshaw will move from supine to sit and sit to supine  in bed with INDEPENDENT within 7 treatment day(s).    (2.)Mr. Bradshaw will transfer from bed to chair and chair to bed with SUPERVISION using the least restrictive device within 7 treatment day(s).    (3.)Mr. Bradshaw will ambulate with SUPERVISION for 650 feet with the least restrictive device within 7 treatment day(s).  ________________________________________________________________________________________________      PHYSICAL THERAPY Initial Assessment and PM  (Link to Caseload Tracking: PT Visit Days : 1  Acknowledge Orders  Time In/Out  PT Charge Capture  Rehab Caseload Tracker    Corky Bradshaw is a 69 y.o. male   PRIMARY DIAGNOSIS: Severe sepsis (HCC)  Hyponatremia [E87.1]  Septicemia (HCC) [A41.9]       Reason for Referral: Generalized Muscle Weakness (M62.81)  Other abnormalities of gait and mobility (R26.89)  Inpatient: Payor: MEDICARE / Plan: MEDICARE PART A AND B / Product Type: *No Product type* /     ASSESSMENT:     REHAB RECOMMENDATIONS:   Recommendation to date pending progress:  Setting:  No further skilled physical therapy after discharge from hospital    Equipment:    None     ASSESSMENT:  Mr. Bradshaw presents with general weakness and decreased functional mobility admitted with above diagnosis.  He lives with his wife and family and is normally independent and drives.  He did have surgery 2 weeks ago for a pharyngeal mass.      This afternoon, he was agreeable.  He is alert.  He is making conversation, sometimes difficult to understand.  He transferred and ambulated SBA in the renteria and did well.  He used restroom on his own in sitting.  He stood at sink for clean up.  He settled in chair and was comfortable. He would benefit from continued PT while he is here and hopefully will progress so that he does not need HHPT.     [place]

## 2024-12-03 NOTE — PROGRESS NOTES
ACUTE OCCUPATIONAL THERAPY GOALS:   (Developed with and agreed upon by patient and/or caregiver.)  1. Patient will perform grooming with SPV and adaptive equipment as needed.  2. Patient will perform lower body dressing with SPV and adaptive equipment as needed.  3. Patient will perform toileting and toilet transfer with SPV and adaptive equipment as needed.  4. Patient will perform ADL functional mobility and tranfers in room with SPV and adaptive equipment as needed.  5. Patient/family to demonstrate knowledge of home safety and DME recommendations.    Timeframe: 7 visits     OCCUPATIONAL THERAPY Initial Assessment, Daily Note, Discharge, and PM       OT Visit Days: 1  Acknowledge Orders  Time  OT Charge Capture  Rehab Caseload Tracker      Corky Bradshaw is a 69 y.o. male   PRIMARY DIAGNOSIS: Severe sepsis (HCC)  Hyponatremia [E87.1]  Septicemia (HCC) [A41.9]       Reason for Referral: Generalized Muscle Weakness (M62.81)  Inpatient: Payor: MEDICARE / Plan: MEDICARE PART A AND B / Product Type: *No Product type* /     ASSESSMENT:     REHAB RECOMMENDATIONS:   Recommendation to date pending progress:  Setting:  No further skilled occupational therapy after discharge from hospital    Equipment:    None     ASSESSMENT:  Mr. Bradshaw is admitted for the above diagnoses and presents with overall deficits in strength, functional mobility and ADL performance.  He lives with spouse in a 1 level home with no steps. At baseline, he reports independence with ADLs and with mobility. He has a PMH that includes lymphoma, ridgell cancer, HIV and recent surgical resection of a portion of his tongue. He also has a PEG for nutrition. Today, he was able to perform bed mobility, toileting, hygiene and household mobility at a SPV level. Reports pain in L lower back. No OT-related questions or concerns. He was left positioned for comfort in recliner chair with all needs met and in reach. No further skilled OT indicated at this  [] []    Transfers    Sit to Stand [] [] [x] [] [] [] [] [] [] [] []    Bed to Chair [] [] [x] [] [] [] [] [] [] [] []    Stand to Sit [] [] [x] [] [] [] [] [] [] [] []    Tub/Shower [] [] [] [] [] [] [] [] [] [] []     Toilet [] [] [x] [] [] [] [] [] [] [] []      [] [] [] [] [] [] [] [] [] [] []    I=Independent, Mod I=Modified Independent, S=Supervision/Setup, SBA=Standby Assistance, CGA=Contact Guard Assistance, Min=Minimal Assistance, Mod=Moderate Assistance, Max=Maximal Assistance, Total=Total Assistance, NT=Not Tested    ACTIVITIES OF DAILY LIVING: I Mod I S SBA CGA Min Mod Max Total NT Comments   BASIC ADLs:              Upper Body Bathing  [] [] [] [] [] [] [] [] [] []     Lower Body Bathing [] [] [] [] [] [] [] [] [] []     Toileting [] [] [x] [] [] [] [] [] [] []    Upper Body Dressing [] [] [] [] [] [] [] [] [] []    Lower Body Dressing [] [] [x] [] [] [] [] [] [] []    Feeding [] [] [] [] [] [] [] [] [] []    Grooming [] [] [x] [] [] [] [] [] [] []    Personal Device Care [] [] [] [] [] [] [] [] [] []    Functional Mobility [] [] [x] [] [] [] [] [] [] []    I=Independent, Mod I=Modified Independent, S=Supervision/Setup, SBA=Standby Assistance, CGA=Contact Guard Assistance, Min=Minimal Assistance, Mod=Moderate Assistance, Max=Maximal Assistance, Total=Total Assistance, NT=Not Tested    PLAN:   FREQUENCY/DURATION   OT Plan of Care:  (EVAL & DC) for duration of hospital stay or until stated goals are met, whichever comes first.    PROBLEM LIST:   (Skilled intervention is medically necessary to address:)  Decreased ADL/Functional Activities  Eval & dc   INTERVENTIONS PLANNED:  (Benefits and precautions of occupational therapy have been discussed with the patient.)  Self Care Training  Therapeutic Activity  Education         TREATMENT:     EVALUATION: LOW COMPLEXITY: (Untimed Charge)  The initial evaluation charge encompasses clinical chart review, objective assessment, interpretation of assessment, and

## 2024-12-03 NOTE — H&P
[facility] Hospitalist Initial History and Physical Note    Patient: Corky Bradshaw Date: 12/2/2024  male, 69 y.o.  Admit Date: 12/2/2024  Attending: Aaron El MD     ASSESSMENT AND PLAN:     Principal Problem:    Hyponatremia  Plan: IV NS, follow BMP.  Active Problems:    Lactic acidosis  Plan: IVF, follow lactic acid    UTI (urinary tract infection)  Plan: IV Unasyn to cover UTI and possible oropharyngeal source of infection. Urine/blood cultures pending.    Primary tongue squamous cell carcinoma (HCC)  Plan: Concern for possible superinfection. CT head/neck with no acute findings. Consult oncology/ENT. IV pain/nausea control.    Leukocytosis  Plan: Follow CBC    Normocytic anemia  Plan: Follow CBC    HIV (human immunodeficiency virus infection) (HCC)  Plan: Stable. Continue home meds.     Hodgkin's disease (HCC)  Plan: Stable. Continue home meds.     Paroxysmal atrial fibrillation (HCC)  Plan: Stable. Continue home meds.          DVT Prophylaxis: Lovenox      Code Status: FULL CODE      Disposition: Admit to med/surg for evaluation and treatment as per above.      Anticipated discharge: 2-3 days     CHIEF COMPLAINT:  chills, headaches    HISTORY OF PRESENT ILLNESS:      Patient Active Problem List   Diagnosis    HIV (human immunodeficiency virus infection) (HCC)    Hodgkin's disease (HCC)    Paroxysmal atrial fibrillation (HCC)    History of lymphoma    Bacteremia due to Streptococcus pneumoniae    Finding of above normal blood pressure    Bunion of great toe of right foot    Tailor's bunionette, right    Primary tongue squamous cell carcinoma (HCC)    Hyponatremia    Lactic acidosis    Leukocytosis    Normocytic anemia    UTI (urinary tract infection)       Corky Bradshaw is a 69 y.o. male, with a history of  has no past medical history on file.,  has no past surgical history on file. who presents to the ER with report of chills and headaches for the past 3 days or so. Admits to subjective fever and

## 2024-12-03 NOTE — PROGRESS NOTES
TRANSFER - IN REPORT:    Verbal report received from Michelle PRASAD on Corky Bradshaw  being received from ED(unit) for routine progression of care      Report consisted of patient's Situation, Background, Assessment and   Recommendations(SBAR).     Information from the following report(s) SBAR, ED Summary, MAR, and Recent Results was reviewed with the receiving nurse.    Opportunity for questions and clarification was provided.      Assessment completed upon patient's arrival to unit and care assumed.

## 2024-12-03 NOTE — CONSULTS
Nutrition Assessment  Interpretor used for this encounter  Assessment Type: Initial, Positive nutrition screen, Consult  Reason for visit:  Malnutrition Screening Tool: Malnutrition Screen  Have you recently lost weight without trying?: 24 to 33 pounds (3 points)  Have you been eating poorly because of a decreased appetite?: No (0 points)  Malnutrition Screening Tool Score: 3, Tube Feeding Management (Hospitalists), and Best Practice Alert for Current Home EN/PN  Malnutrition Screening Tool Score: 3    Nutrition Intervention:   Food and/or Nutrient Delivery:   Enteral Nutrition:   Enteral Access: PEG  Initiate  Formula: Other Tube Feeding (Ensure ENLIVE or PLUS))  Goal Rate: Bolus 300 ml times 4/day 0800, 1200, 1600, 2000  Initiate  Water flush  100 ml before and after each bolus  Modulars: None not indicated at this time   Enteral regimen at above goal to provide per 24 hours:  1800 calories, 96 grams protein and 1712ml free fluid.    Above regimen: Intended to meet macronutrient goals  IV Fluids:  Continue per provider order  Labs:   EN labs: BMP active per MD. Phos and Mg in AM  POC Glucoses/SSI Not indicated  Nutrition Related Medication Management:  Electrolyte Replacement Protocol PRN Modify for enteral access for Potassium. Continue Mg. Phosphorus replacement to be addressed individually secondary to national shortages. Pt at low risk for refeeding given home TF at goal for >2 weeks     Thiamine Not indicated  Bowel Regimen Active prn  Meals and Snacks:  Diet:  Change to NPO  Nutrition Education  Educated on TF formula and water daily goals and suggested schedules and volume for 4 feedings and alternatively 3 feedings per day  Learners: Patient and Family  Readiness: Eager  Method: Explanation and Handout  Response: Verbalizes Understanding       Malnutrition Assessment:  Malnutrition Status: At risk for malnutrition (Severe weight loss but now with trend of weight gain, adeqaute intake from TF PTA)

## 2024-12-03 NOTE — PROGRESS NOTES
virus infection) (HCC)  Plan: Recent CD4 count of 48  Patient to continue darunavir, Norvir and Tivicay.  Outpatient follow-up with ID.      Hodgkin's disease (HCC)  Plan: Currently in remission.      Paroxysmal atrial fibrillation (HCC)  Plan: Twelve-lead EKG this morning with sinus tachycardia.  Recent echo from 11/11/2024 with EF of 55 to 65% with grade 1 diastolic dysfunction.  Starting patient on Coreg 3.125 mg p.o. twice daily for tachycardia and midodrine to prevent any hypotension.      Primary tongue squamous cell carcinoma (HCC)  Plan: Patient to follow-up with surgical oncology at [facility].  To follow-up with Dr. Owusu's group.      Normocytic anemia  Plan: Hemoglobin of 8.1, MCV 79.8.  Hemoglobin is stable with no signs of ongoing bleeding.        Anticipated Discharge Arrangements:   Therapy has not evaluated yet    PT/OT evals ordered?  Therapy evals ordered  Diet:  ADULT DIET; Regular  VTE prophylaxis: Lovenox  Code status: Full Code      Non-peripheral Lines and Tubes (if present):          Telemetry (if present):  Cardiac/Telemetry Monitor On: No        Hospital Problems:  Principal Problem:    Hyponatremia  Active Problems:    HIV (human immunodeficiency virus infection) (HCC)    Hodgkin's disease (HCC)    Paroxysmal atrial fibrillation (HCC)    Primary tongue squamous cell carcinoma (HCC)    Lactic acidosis    Leukocytosis    Normocytic anemia    Acute UTI    Severe sepsis (HCC)  Resolved Problems:    * No resolved hospital problems. *      Objective:   Patient Vitals for the past 24 hrs:   Temp Pulse Resp BP SpO2   12/02/24 2252 97.5 °F (36.4 °C) 91 18 103/68 100 %   12/02/24 2200 -- 86 22 94/62 98 %   12/02/24 2130 -- 84 15 103/61 98 %   12/02/24 2100 -- (!) 104 21 105/64 100 %   12/02/24 2030 -- 92 -- (!) 96/57 97 %   12/02/24 2000 -- -- -- (!) 93/53 97 %   12/02/24 1930 -- 93 25 (!) 96/57 95 %   12/02/24 1924 -- 94 23 (!) 99/57 96 %   12/02/24 1844 -- (!) 107 27 -- --   12/02/24  Range    Source Nasopharyngeal      SARS-CoV-2, Rapid Not detected NOTD     Urinalysis    Collection Time: 12/02/24  6:31 PM   Result Value Ref Range    Color, UA DARK YELLOW      Appearance CLOUDY      Specific Gravity, UA 1.028 (H) 1.001 - 1.023      pH, Urine 5.5 5.0 - 9.0      Protein,  (A) NEG mg/dL    Glucose, Ur Negative NEG mg/dL    Ketones, Urine TRACE (A) NEG mg/dL    Bilirubin, Urine Negative NEG      Blood, Urine Negative NEG      Urobilinogen, Urine 1.0 0.2 - 1.0 EU/dL    Nitrite, Urine Negative NEG      Leukocyte Esterase, Urine TRACE (A) NEG      WBC, UA 5-10 0 /hpf    Epithelial Cells, UA 3-5 0 /hpf    BACTERIA, URINE 3+ (H) 0 /hpf    Casts HYALINE 0 /lpf   Lactate, Sepsis    Collection Time: 12/02/24  7:31 PM   Result Value Ref Range    Lactic Acid, Sepsis 2.2 (H) 0.5 - 2.0 MMOL/L   Comprehensive Metabolic Panel w/ Reflex to MG    Collection Time: 12/03/24  3:04 AM   Result Value Ref Range    Sodium 128 (L) 136 - 145 mmol/L    Potassium 3.7 3.5 - 5.1 mmol/L    Chloride 92 (L) 98 - 107 mmol/L    CO2 25 20 - 29 mmol/L    Anion Gap 11 7 - 16 mmol/L    Glucose 138 (H) 70 - 99 mg/dL    BUN 19 8 - 23 MG/DL    Creatinine 0.68 (L) 0.80 - 1.30 MG/DL    Est, Glom Filt Rate >90 >60 ml/min/1.73m2    Calcium 8.3 (L) 8.8 - 10.2 MG/DL    Total Bilirubin 0.4 0.0 - 1.2 MG/DL    ALT 30 8 - 55 U/L    AST 31 15 - 37 U/L    Alk Phosphatase 88 40 - 129 U/L    Total Protein 5.3 (L) 6.3 - 8.2 g/dL    Albumin 2.0 (L) 3.2 - 4.6 g/dL    Globulin 3.3 2.3 - 3.5 g/dL    Albumin/Globulin Ratio 0.6 (L) 1.0 - 1.9     CBC with Auto Differential    Collection Time: 12/03/24  3:04 AM   Result Value Ref Range    WBC 13.2 (H) 4.3 - 11.1 K/uL    RBC 2.97 (L) 4.23 - 5.6 M/uL    Hemoglobin 8.1 (L) 13.6 - 17.2 g/dL    Hematocrit 23.7 (L) 41.1 - 50.3 %    MCV 79.8 (L) 82.0 - 102.0 FL    MCH 27.3 26.1 - 32.9 PG    MCHC 34.2 31.4 - 35.0 g/dL    RDW 14.6 11.9 - 14.6 %    Platelets 339 150 - 450 K/uL    MPV 9.2 (L) 9.4 - 12.3 FL    nRBC

## 2024-12-03 NOTE — CARE COORDINATION
Admitted with severe sepsis. Assessment completed with patient at bedside using  [place] [id number]. Patient lives with his wife at address on file. Physically independent prior to admission. Underwent oropharyngeal mass removal and peg placement about two weeks ago. Denied being set up with infusion company for tube feedings. [facility] just gave him some formula. Per Marcy CM note from 11/20 patient set up with [facility] for bolus tube feeds. Denies use of DME/HH/SNF. Family to transport at [place].     Patient with UTI and sepsis, cultures pending. On abx. PT/OT state no further needs.     CM to confirm with [facility] that tube feeds are arranged.        12/03/24 9038   Service Assessment   Patient Orientation Alert and Oriented;Person;Situation;Place   Cognition Alert   History Provided By Patient   Primary Caregiver Spouse   Accompanied By/Relationship NA   Support Systems Spouse/Significant Other   Patient's Healthcare Decision Maker is: Legal Next of Kin   PCP Verified by CM Yes   Prior Functional Level Independent in ADLs/IADLs   Current Functional Level Independent in ADLs/IADLs   Can patient return to prior living arrangement Yes   Ability to make needs known: Good   Family able to assist with home care needs: Yes   Would you like for me to discuss the discharge plan with any other family members/significant others, and if so, who? No   Financial Resources Medicare   Community Resources None   CM/SW Referral Other (see comment)  (Discharge planning)   Social/Functional History   Lives With Spouse   Type of Home House   Home Layout One level   Receives Help From Family   Prior Level of Assist for ADLs Independent   Prior Level of Assist for Homemaking Independent   Homemaking Responsibilities No   Ambulation Assistance Independent   Prior Level of Assist for Transfers Independent   Occupation Retired   Discharge Planning   Type of Residence House   Living Arrangements Spouse/Significant Other   Current  Services Prior To Admission None   Potential Assistance Needed Other (Comment)  (Formula for peg)   DME Ordered? No   Potential Assistance Purchasing Medications No   Type of Home Care Services PT;OT;Nursing Services   Patient expects to be discharged to: House   Condition of Participation: Discharge Planning   The Plan for Transition of Care is related to the following treatment goals: Anticipate routine vs HH at NH

## 2024-12-03 NOTE — PROGRESS NOTES
/Cultural Navigator rounded in person and assessed patient's language needs.  Provided interpreting services for encounters with Sarah Wagner MD, Luis Alfredo, RN and Karen Lyons RD     Direct contact information was provided for further assistance interpreting and Navigating through the medical system. An opportunity for questions and clarifications were provided. Language services interpreting resources were offered as needed.     Thank you,         An CARLOS  Senior /Navigator   Language Services Department  [phone] (phone)

## 2024-12-03 NOTE — ED NOTES
TRANSFER - OUT REPORT:    Verbal report given to OSMAR Guerrero on Corky Bradshaw  being transferred to room 346 for routine progression of patient care       Report consisted of patient's Situation, Background, Assessment and   Recommendations(SBAR).     Information from the following report(s) Nurse Handoff Report, ED Encounter Summary, ED SBAR, Adult Overview, Intake/Output, MAR, Recent Results, and Neuro Assessment was reviewed with the receiving nurse.    [place] Assessment:    Presents to emergency department  because of falls (Syncope, seizure, or loss of consciousness): No  Age > 70: No  Altered Mental Status, Intoxication with alcohol or substance confusion (Disorientation, impaired judgment, poor safety awaremess, or inability to follow instructions): No  Impaired Mobility: Ambulates or transfers with assistive devices or assistance; Unable to ambulate or transer.: No  Nursing Judgement: No          Lines:   Peripheral IV 12/02/24 Right Antecubital (Active)   Site Assessment Clean, dry & intact 12/02/24 1708   Line Status Blood return noted;Flushed;Specimen collected;Normal saline locked 12/02/24 1708   Phlebitis Assessment No symptoms 12/02/24 1708   Infiltration Assessment 0 12/02/24 1708   Dressing Status New dressing applied 12/02/24 1708   Dressing Type Transparent 12/02/24 1708        Opportunity for questions and clarification was provided.      Patient transported with:  Registered Nurse

## 2024-12-04 ENCOUNTER — APPOINTMENT (OUTPATIENT)
Dept: NON INVASIVE DIAGNOSTICS | Age: 69
DRG: 975 | End: 2024-12-04
Attending: HOSPITALIST
Payer: MEDICARE

## 2024-12-04 LAB
ALBUMIN SERPL-MCNC: 2 G/DL (ref 3.2–4.6)
ALBUMIN/GLOB SERPL: 0.5 (ref 1–1.9)
ALP SERPL-CCNC: 83 U/L (ref 40–129)
ALT SERPL-CCNC: 24 U/L (ref 8–55)
ANION GAP SERPL CALC-SCNC: 11 MMOL/L (ref 7–16)
AST SERPL-CCNC: 24 U/L (ref 15–37)
BACTERIA SPEC CULT: NORMAL
BASOPHILS # BLD: 0 K/UL (ref 0–0.2)
BASOPHILS NFR BLD: 0 % (ref 0–2)
BILIRUB SERPL-MCNC: 0.3 MG/DL (ref 0–1.2)
BUN SERPL-MCNC: 16 MG/DL (ref 8–23)
CALCIUM SERPL-MCNC: 8.6 MG/DL (ref 8.8–10.2)
CHLORIDE SERPL-SCNC: 92 MMOL/L (ref 98–107)
CO2 SERPL-SCNC: 28 MMOL/L (ref 20–29)
CREAT SERPL-MCNC: 0.64 MG/DL (ref 0.8–1.3)
DIFFERENTIAL METHOD BLD: ABNORMAL
ECHO BSA: 1.73 M2
ECHO LV EJECTION FRACTION BIPLANE: 63 % (ref 55–100)
EOSINOPHIL # BLD: 0.1 K/UL (ref 0–0.8)
EOSINOPHIL NFR BLD: 1 % (ref 0.5–7.8)
ERYTHROCYTE [DISTWIDTH] IN BLOOD BY AUTOMATED COUNT: 14.7 % (ref 11.9–14.6)
GLOBULIN SER CALC-MCNC: 3.9 G/DL (ref 2.3–3.5)
GLUCOSE SERPL-MCNC: 106 MG/DL (ref 70–99)
HAPTOGLOB SERPL-MCNC: 413 MG/DL (ref 30–200)
HCT VFR BLD AUTO: 24.8 % (ref 41.1–50.3)
HGB BLD-MCNC: 8.5 G/DL (ref 13.6–17.2)
IMM GRANULOCYTES # BLD AUTO: 0.1 K/UL (ref 0–0.5)
IMM GRANULOCYTES NFR BLD AUTO: 1 % (ref 0–5)
LYMPHOCYTES # BLD: 1.2 K/UL (ref 0.5–4.6)
LYMPHOCYTES NFR BLD: 13 % (ref 13–44)
MAGNESIUM SERPL-MCNC: 1.8 MG/DL (ref 1.8–2.4)
MCH RBC QN AUTO: 27.3 PG (ref 26.1–32.9)
MCHC RBC AUTO-ENTMCNC: 34.3 G/DL (ref 31.4–35)
MCV RBC AUTO: 79.7 FL (ref 82–102)
MONOCYTES # BLD: 0.9 K/UL (ref 0.1–1.3)
MONOCYTES NFR BLD: 9 % (ref 4–12)
NEUTS SEG # BLD: 7.3 K/UL (ref 1.7–8.2)
NEUTS SEG NFR BLD: 77 % (ref 43–78)
NRBC # BLD: 0 K/UL (ref 0–0.2)
PATH REV BLD -IMP: NORMAL
PHOSPHATE SERPL-MCNC: 3 MG/DL (ref 2.5–4.5)
PLATELET # BLD AUTO: 381 K/UL (ref 150–450)
PMV BLD AUTO: 9.5 FL (ref 9.4–12.3)
POTASSIUM SERPL-SCNC: 2.9 MMOL/L (ref 3.5–5.1)
PROT SERPL-MCNC: 5.9 G/DL (ref 6.3–8.2)
RBC # BLD AUTO: 3.11 M/UL (ref 4.23–5.6)
SERVICE CMNT-IMP: NORMAL
SODIUM SERPL-SCNC: 131 MMOL/L (ref 136–145)
WBC # BLD AUTO: 9.5 K/UL (ref 4.3–11.1)

## 2024-12-04 PROCEDURE — 84100 ASSAY OF PHOSPHORUS: CPT

## 2024-12-04 PROCEDURE — 93308 TTE F-UP OR LMTD: CPT

## 2024-12-04 PROCEDURE — 2580000003 HC RX 258: Performed by: HOSPITALIST

## 2024-12-04 PROCEDURE — 6370000000 HC RX 637 (ALT 250 FOR IP): Performed by: HOSPITALIST

## 2024-12-04 PROCEDURE — 6360000002 HC RX W HCPCS: Performed by: HOSPITALIST

## 2024-12-04 PROCEDURE — 85025 COMPLETE CBC W/AUTO DIFF WBC: CPT

## 2024-12-04 PROCEDURE — 93308 TTE F-UP OR LMTD: CPT | Performed by: INTERNAL MEDICINE

## 2024-12-04 PROCEDURE — 80053 COMPREHEN METABOLIC PANEL: CPT

## 2024-12-04 PROCEDURE — 6360000002 HC RX W HCPCS: Performed by: INTERNAL MEDICINE

## 2024-12-04 PROCEDURE — 97530 THERAPEUTIC ACTIVITIES: CPT

## 2024-12-04 PROCEDURE — 36415 COLL VENOUS BLD VENIPUNCTURE: CPT

## 2024-12-04 PROCEDURE — 2580000003 HC RX 258: Performed by: INTERNAL MEDICINE

## 2024-12-04 PROCEDURE — 6370000000 HC RX 637 (ALT 250 FOR IP): Performed by: FAMILY MEDICINE

## 2024-12-04 PROCEDURE — 1100000000 HC RM PRIVATE

## 2024-12-04 PROCEDURE — 2580000003 HC RX 258: Performed by: FAMILY MEDICINE

## 2024-12-04 PROCEDURE — 6360000002 HC RX W HCPCS: Performed by: FAMILY MEDICINE

## 2024-12-04 PROCEDURE — 87040 BLOOD CULTURE FOR BACTERIA: CPT

## 2024-12-04 PROCEDURE — 93325 DOPPLER ECHO COLOR FLOW MAPG: CPT | Performed by: INTERNAL MEDICINE

## 2024-12-04 PROCEDURE — 83735 ASSAY OF MAGNESIUM: CPT

## 2024-12-04 RX ADMIN — CARVEDILOL 3.12 MG: 6.25 TABLET, FILM COATED ORAL at 17:18

## 2024-12-04 RX ADMIN — Medication 30 G: at 09:00

## 2024-12-04 RX ADMIN — CEFTRIAXONE SODIUM 2000 MG: 2 INJECTION, POWDER, FOR SOLUTION INTRAMUSCULAR; INTRAVENOUS at 10:01

## 2024-12-04 RX ADMIN — SULFAMETHOXAZOLE AND TRIMETHOPRIM 20 ML: 200; 40 SUSPENSION ORAL at 09:00

## 2024-12-04 RX ADMIN — POTASSIUM BICARBONATE 40 MEQ: 391 TABLET, EFFERVESCENT ORAL at 09:23

## 2024-12-04 RX ADMIN — MIDODRINE HYDROCHLORIDE 5 MG: 5 TABLET ORAL at 08:59

## 2024-12-04 RX ADMIN — MIDODRINE HYDROCHLORIDE 5 MG: 5 TABLET ORAL at 12:59

## 2024-12-04 RX ADMIN — ENOXAPARIN SODIUM 40 MG: 100 INJECTION SUBCUTANEOUS at 08:58

## 2024-12-04 RX ADMIN — MIDODRINE HYDROCHLORIDE 5 MG: 5 TABLET ORAL at 17:19

## 2024-12-04 RX ADMIN — SODIUM CHLORIDE, PRESERVATIVE FREE 10 ML: 5 INJECTION INTRAVENOUS at 09:00

## 2024-12-04 RX ADMIN — SODIUM CHLORIDE, PRESERVATIVE FREE 10 ML: 5 INJECTION INTRAVENOUS at 21:26

## 2024-12-04 RX ADMIN — CEFEPIME 2000 MG: 2 INJECTION, POWDER, FOR SOLUTION INTRAVENOUS at 04:56

## 2024-12-04 ASSESSMENT — PAIN SCALES - GENERAL
PAINLEVEL_OUTOF10: 0
PAINLEVEL_OUTOF10: 0

## 2024-12-04 NOTE — PROGRESS NOTES
Patient/caregivers speak Somali  as their preferred language for their healthcare communication. For safe communication, use the AMN  carts or call:    Senior  Navigator An Hoyt at [phone] or   AMN phone services for [facility] at [phone]    General phone: 013-[facility] ( [phone])  Email: [email or web address]    Always document the use of interpreting services ('s ID number) in your clinical notes.    Our interpreters are available for team members working with limited  English proficient (LEP) patients remotely, via phone or video or in person (if needed for special cases).    When using family members to interpret, for the safety of the patient and protection of the communication of both our patient and [facility] staff the VRI or telephonic  should remain on the line to monitor that all communication is accurate and complete. The  should be instructed to notify [facility] staff immediately if there are any inaccuracies.         Thank you,        An CARLOS  Senior /Navigator

## 2024-12-04 NOTE — PROGRESS NOTES
available via (VRI) during evaluation with Dr. Wagner and Devante, .      Thank you,    Tha Earl [facility]  Certified Healthcare InterpreterTM  Irish              [place] [address] [phone]  (phone)

## 2024-12-04 NOTE — CONSULTS
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  He needs repeat blood cultures.  TTE is negative.  Would not plan on SHIVA at this time unless he has recurrent bacteremia.  Change from cefepime to ceftriaxone for ease of dosing.  Continue oral Bactrim for PJP prophylaxis.  Continue current HIV treatment with DRV/r/DTG via PEG tube.  Genotype pending from prior hospital stay at [facility].  Outpatient f/up at [facility] is planned.    Anti-infectives:   PO darunavir/ritonavir  PO Bactrim  IV cefepime    Subjective:   Date of Consultation:  December 4, 2024  Referring Physician: Sarah Wagner MD for: assistance in management of bacteremia    Patient is a 69 y.o. male who is known to ID for HIV/AIDS and recent hospitalization at [facility] for respiratory failure.  He had recent surgery for tongue cancer and ultimately needed a PEG for meds.  His HIV treatment was switched from Prezista/Genvoya to Prezista/norvir/Tivicay via liquid formulation.  He was discharged on 11/20 with a plan for outpatient f/up on 12/19/24.  He did well initially at home, but then developed fever and chills at home and after a few days of this he presented to E on 12/2/24.  On presentation he had leukocytosis and tachycardia concerning for sepsis.  He was initiated on broad antibiotics and had CT neck and chest which showed no clear source of sepsis.  His admission BCX have grown alpha Strep in both admission sets.  He was transitioned to IV cefepime after this resulted.  Today ID is consulted for  epidermidis by PCR NOT DETECTED        Staphylococcus lugdunensis by PCR NOT DETECTED        STREPTOCOCCUS Detected        Comment: RESULTS VERIFIED, PHONED TO AND READ BACK BY  WALDO VALVERDE RN @ 5120 ON 12.3.24 MANUEL          Streptococcus agalactiae (Group B) NOT DETECTED        Strep pneumoniae NOT DETECTED        Strep pyogenes,(Grp. A) NOT DETECTED        Acinetobacter calcoac baumannii complex by PCR NOT DETECTED        Bacteroides fragilis by PCR NOT DETECTED        Enterobacteriaceae by PCR NOT DETECTED        Enterobacter cloacae complex by PCR NOT DETECTED        Escherichia Coli NOT DETECTED        Klebsiella aerogenes by PCR NOT DETECTED        Klebsiella oxytoca by PCR NOT DETECTED        Klebsiella pneumoniae group by PCR NOT DETECTED        Proteus by PCR NOT DETECTED        Salmonella species by PCR NOT DETECTED        Serratia marcescens by PCR NOT DETECTED        Haemophilus Influenzae by PCR NOT DETECTED        Neisseria meningitidis by PCR NOT DETECTED        Pseudomonas aeruginosa NOT DETECTED        Stenotrophomonas maltophilia by PCR NOT DETECTED        Candida albicans by PCR NOT DETECTED        Candida auris by PCR NOT DETECTED        Candida glabrata NOT DETECTED        Candida krusei by PCR NOT DETECTED        Candida parapsilosis by PCR NOT DETECTED        Candida tropicalis by PCR NOT DETECTED        Cryptococcus neoformans/gattii by PCR NOT DETECTED        Resistant gene targets          [employer] test comment       False positive results may rarely occur. Correlate with clinical,epidemiologic, and other laboratory findings           Comment: Please see BCID Interpretation Guide in EPIC Links       Culture, Blood 1 [[id number]]  (Abnormal) Collected: 12/02/24 1659    Order Status: Completed Specimen: Blood Updated: 12/04/24 0692     Special Requests --        RIGHT  Antecubital       Gram Stain Gram positive cocci               AEROBIC AND ANAEROBIC BOTTLES                  CRITICAL

## 2024-12-04 NOTE — PROGRESS NOTES
ACUTE PHYSICAL THERAPY GOALS:   (Developed with and agreed upon by patient and/or caregiver.)    LTG:  (1.)Mr. Bradshaw will move from supine to sit and sit to supine  in bed with INDEPENDENT within 7 treatment day(s).    (2.)Mr. Bradshaw will transfer from bed to chair and chair to bed with SUPERVISION using the least restrictive device within 7 treatment day(s).    (3.)Mr. Bradshaw will ambulate with SUPERVISION for 650 feet with the least restrictive device within 7 treatment day(s).  ________________________________________________________________________________________________      PHYSICAL THERAPY Daily Note and AM  (Link to Caseload Tracking: PT Visit Days : 2  Acknowledge Orders  Time In/Out  PT Charge Capture  Rehab Caseload Tracker    Corky Bradshaw is a 69 y.o. male   PRIMARY DIAGNOSIS: Septicemia (HCC)  Hyponatremia [E87.1]  Septicemia (HCC) [A41.9]       Reason for Referral: Generalized Muscle Weakness (M62.81)  Other abnormalities of gait and mobility (R26.89)  Inpatient: Payor: MEDICARE / Plan: MEDICARE PART A AND B / Product Type: *No Product type* /     ASSESSMENT:     REHAB RECOMMENDATIONS:   Recommendation to date pending progress:  Setting:  No further skilled physical therapy after discharge from hospital    Equipment:    None     ASSESSMENT:  Mr. Bradshaw presents with general weakness and decreased functional mobility admitted with above diagnosis.  He lives with his wife and family and is normally independent and drives.  He did have surgery 2 weeks ago for a pharyngeal mass.      This afternoon, he was agreeable.  He is alert.  He is making conversation, sometimes difficult to understand.  He transferred and ambulated SBA in the renteria and did well.  He used restroom on his own in sitting.  He stood at sink for clean up.  He settled in chair and was comfortable. He would benefit from continued PT while he is here and hopefully will progress so that he does not need HHPT.    12/4 - pt. Reports

## 2024-12-04 NOTE — PROGRESS NOTES
Hospitalist Progress Note   Admit Date:  2024  4:41 PM   Name:  Corky Bradshaw   Age:  69 y.o.  Sex:  male  :  1955   MRN:  [id number]   Room:  [facility]    Presenting/Chief Complaint: Shaking     Reason(s) for Admission: Hyponatremia [E87.1]  Septicemia (HCC) [A41.9]     Hospital Course:   Corky Bradshaw is a 69 y.o. male with medical history of HIV (on Genvoya and Prezista), Hodgkin's lymphoma (in remission since ), neuropathy, hypertension,, cell carcinoma of right tongue and tonsil status post surgery (follows up with neurosurgery at [facility]), status post PEG tube admitted on  with sepsis secondary to UTI in view of lactic acidosis, leukocytosis.        Subjective & 24hr Events:   -  Patient seen and examined at bedside.  Used  to help in communicating with patient and his spouse.  All question answered to best of my ability.  Patient and his spouse informed about ongoing bloodstream infection with plan to continue intravenous antibiotics for now.  Patient denies having any chest pain, abdominal pain, nausea vomiting, lightheadedness, shortness of breath, fever chills.      ROS:  10 point review of system is negative except for what mentioned above.      Assessment & Plan:     Principal Problem:    Acute UTI    Severe sepsis (HCC)    Lactic acidosis    Leukocytosis    Bacteremia due to alpha Streptococcus  Plan: -  Patient continues to be hemodynamically stable.  Lactic acidosis has resolved.  Continue gentle IV fluids for the 24 hours.  Blood culture positive for alpha Streptococcus.  Consulted ID to evaluate patient.  Order for 2D echo to rule out infective endocarditis.  Continue empiric IV cefepime, D1.  Urine culture negative to date.  RVP negative.  Leukocytosis has resolved.      Hyponatremia  Plan: -  Hyponatremia has resolved, sodium of 131 today.  Continue urea NA.  Potassium of 2.9, ordered for Effer-K 40 mEq every 2

## 2024-12-04 NOTE — PROGRESS NOTES
Patient potassium this morning was 2.9 and was given the 40 mEq dose to replace, MD notified and adviced to recheck potassium level in the morning  in order to proceed with the 6 bags adminitration per replacement protocol if needed.

## 2024-12-04 NOTE — CARE COORDINATION
RNTWAN had conversation with patient/spouse with assist of  Tha, ID [id number].  RNTWAN confirmed with Raj at [facility] that they got tube feed referral from previous hospitalization at [facility].  They have spoken to his spouse.  Per Raj, he texted the spouse their phone number to call.  All that is left to do to set up the tube feeding is that Spouse calls that number.  RNCM reinforced instruction with patient and spouse and she stated that she will call [facility]'s number to finalize setting up the home tube feeds.  RNCM also spoke to patient about setting up  SNservices to reinforce PEG tube education and management.  He is agreeable.  Referral sent to [facility].

## 2024-12-04 NOTE — PROGRESS NOTES
Nutrition Assessment  Interpretor used for this encounter  Assessment Type: Reassess  Reason for visit:  Malnutrition Screening Tool: Malnutrition Screen  Have you recently lost weight without trying?: 24 to 33 pounds (3 points)  Have you been eating poorly because of a decreased appetite?: No (0 points)  Malnutrition Screening Tool Score: 3, Tube Feeding Management (Hospitalists), and Best Practice Alert for Current Home EN/PN  Malnutrition Screening Tool Score: 3    Nutrition Intervention:   Food and/or Nutrient Delivery:   Enteral Nutrition:   Enteral Access: PEG  Advance  Formula: Other Tube Feeding (Ensure ENLIVE or PLUS))  Goal Rate: Bolus 400 ml times 3/day Suggested Feeding Times: 0800, 1300, 1800  Increase  Water flush  120 ml before and after each bolus  Modulars: None not indicated at this time   Enteral regimen at above goal to provide per 24 hours:  1800 calories, 96 grams protein and 1712ml free fluid.    Above regimen: Intended to meet macronutrient goals  IV Fluids:  Not applicable  Labs:   EN labs: BMP active per MD. POC Glucoses/SSI Not indicated  Nutrition Related Medication Management:  Electrolyte Replacement Protocol PRN Continue for Magnesium and Potassium. Phosphorus replacement to be addressed individually secondary to national shortages.    Thiamine Not indicated  Bowel Regimen Active prn  Meals and Snacks:  Diet: Continue current order/NPO  Nutrition Education  Reviewed again TF formula and water daily goals and suggested schedules and volume and plan for trial of 3 feedings per day.  4 feeding schedule also reviewed as an alternate if pt has difficulty tolerating increased volume of 3 feeding volumes. and alternatively 3 feedings per day  Learners: Patient and Family  Readiness: Eager  Method: Explanation and Handout  Response: Verbalizes Understanding  Coordination of Nutrition Care:  Coordination with vickie care provider Jane PRASAD. Discussed above TF changes       Malnutrition  4 Times Daily; 300; Gravity; 100; Before and after each bolus  Active Enteral Nutrition Order:  Route: PEG  Composition: Other Tube Feeding (Ensure ENLIVE or PLUS)  Schedule: Bolus  Rate/volume: 300 ml 4 times per day  Additives/Modulars: None  Water Flushes: 100 ml before and after each feeding  Provides per 24 hours: 1800 calories, 96 grams protein and 1712ml free fluid.        Current Intake:   Average Meal Intake: NPO        Anthropometric Measures:  Height: 157.5 cm (5' 2\")  Current Body Wt: 70.8 kg (156 lb 1.4 oz) (12/3), Weight source: Bed scale  BMI: 28.5, Overweight (BMI 25.0-29.9)  Admission Body Weight: 68 kg (150 lb) (no source specified)  Ideal Body Weight (Kg) (Calculated): 54 kg (118 lbs),    BMI Category Overweight (BMI 25.0-29.9)  Comparative Standards:  Energy (kcal/day): 6831-0741 (20-25 kcal/kg) (Kcal/kg Weight used: 70.8 kg Current  Protein (g/day): 71-89 (1-1.25 g/kg) Weight Used: (Current) 70.8 kg  Fluid (ml/day):   (1 ml/kcal)    Nutrition Diagnosis:    (Difficulty swallowing) related to Altered GI structure, swallowing difficulty as evidenced by GI abnormality (MBS results, TF dependent at baseline)    Nutrition Goal(s):   Previous Goal Met: Progressing toward Goal(s)  Active Goal:  (TF to meet estimated needs during admission)  Type of Goal: Continue current goal    Discharge Planning:    Enteral Nutrition    Karen Lyons RD,HENNA, [facility] [phone]

## 2024-12-05 DIAGNOSIS — C14.0 SQUAMOUS CELL CARCINOMA OF PHARYNX (HCC): Primary | ICD-10-CM

## 2024-12-05 LAB
ALBUMIN SERPL-MCNC: 2.1 G/DL (ref 3.2–4.6)
ALBUMIN/GLOB SERPL: 0.6 (ref 1–1.9)
ALP SERPL-CCNC: 88 U/L (ref 40–129)
ALT SERPL-CCNC: 24 U/L (ref 8–55)
ANION GAP SERPL CALC-SCNC: 11 MMOL/L (ref 7–16)
AST SERPL-CCNC: 20 U/L (ref 15–37)
BASOPHILS # BLD: 0 K/UL (ref 0–0.2)
BASOPHILS NFR BLD: 0 % (ref 0–2)
BILIRUB SERPL-MCNC: 0.2 MG/DL (ref 0–1.2)
BUN SERPL-MCNC: 22 MG/DL (ref 8–23)
CALCIUM SERPL-MCNC: 8.7 MG/DL (ref 8.8–10.2)
CHLORIDE SERPL-SCNC: 93 MMOL/L (ref 98–107)
CO2 SERPL-SCNC: 30 MMOL/L (ref 20–29)
COPPER SERPL-MCNC: 146 UG/DL (ref 69–132)
CREAT SERPL-MCNC: 0.71 MG/DL (ref 0.8–1.3)
DIFFERENTIAL METHOD BLD: ABNORMAL
EOSINOPHIL # BLD: 0.2 K/UL (ref 0–0.8)
EOSINOPHIL NFR BLD: 2 % (ref 0.5–7.8)
ERYTHROCYTE [DISTWIDTH] IN BLOOD BY AUTOMATED COUNT: 14.7 % (ref 11.9–14.6)
GLOBULIN SER CALC-MCNC: 3.7 G/DL (ref 2.3–3.5)
GLUCOSE SERPL-MCNC: 99 MG/DL (ref 70–99)
HCT VFR BLD AUTO: 25.5 % (ref 41.1–50.3)
HGB BLD-MCNC: 8.7 G/DL (ref 13.6–17.2)
IMM GRANULOCYTES # BLD AUTO: 0.1 K/UL (ref 0–0.5)
IMM GRANULOCYTES NFR BLD AUTO: 1 % (ref 0–5)
LYMPHOCYTES # BLD: 1 K/UL (ref 0.5–4.6)
LYMPHOCYTES NFR BLD: 13 % (ref 13–44)
MAGNESIUM SERPL-MCNC: 2 MG/DL (ref 1.8–2.4)
MCH RBC QN AUTO: 27.4 PG (ref 26.1–32.9)
MCHC RBC AUTO-ENTMCNC: 34.1 G/DL (ref 31.4–35)
MCV RBC AUTO: 80.2 FL (ref 82–102)
MONOCYTES # BLD: 0.7 K/UL (ref 0.1–1.3)
MONOCYTES NFR BLD: 9 % (ref 4–12)
NEUTS SEG # BLD: 5.8 K/UL (ref 1.7–8.2)
NEUTS SEG NFR BLD: 75 % (ref 43–78)
NRBC # BLD: 0 K/UL (ref 0–0.2)
PLATELET # BLD AUTO: 447 K/UL (ref 150–450)
PMV BLD AUTO: 9.2 FL (ref 9.4–12.3)
POTASSIUM SERPL-SCNC: 3.4 MMOL/L (ref 3.5–5.1)
PROT SERPL-MCNC: 5.7 G/DL (ref 6.3–8.2)
RBC # BLD AUTO: 3.18 M/UL (ref 4.23–5.6)
SODIUM SERPL-SCNC: 134 MMOL/L (ref 136–145)
WBC # BLD AUTO: 7.7 K/UL (ref 4.3–11.1)
ZINC SERPL-MCNC: 45 UG/DL (ref 44–115)

## 2024-12-05 PROCEDURE — 6360000002 HC RX W HCPCS: Performed by: INTERNAL MEDICINE

## 2024-12-05 PROCEDURE — 2580000003 HC RX 258: Performed by: INTERNAL MEDICINE

## 2024-12-05 PROCEDURE — 6360000002 HC RX W HCPCS: Performed by: FAMILY MEDICINE

## 2024-12-05 PROCEDURE — 83735 ASSAY OF MAGNESIUM: CPT

## 2024-12-05 PROCEDURE — 6360000002 HC RX W HCPCS: Performed by: HOSPITALIST

## 2024-12-05 PROCEDURE — 6370000000 HC RX 637 (ALT 250 FOR IP): Performed by: HOSPITALIST

## 2024-12-05 PROCEDURE — 97530 THERAPEUTIC ACTIVITIES: CPT

## 2024-12-05 PROCEDURE — 80053 COMPREHEN METABOLIC PANEL: CPT

## 2024-12-05 PROCEDURE — 2580000003 HC RX 258: Performed by: FAMILY MEDICINE

## 2024-12-05 PROCEDURE — 85025 COMPLETE CBC W/AUTO DIFF WBC: CPT

## 2024-12-05 PROCEDURE — 36415 COLL VENOUS BLD VENIPUNCTURE: CPT

## 2024-12-05 PROCEDURE — 1100000000 HC RM PRIVATE

## 2024-12-05 RX ORDER — MAGNESIUM SULFATE IN WATER 40 MG/ML
2000 INJECTION, SOLUTION INTRAVENOUS ONCE
Status: COMPLETED | OUTPATIENT
Start: 2024-12-05 | End: 2024-12-05

## 2024-12-05 RX ADMIN — SODIUM CHLORIDE, PRESERVATIVE FREE 10 ML: 5 INJECTION INTRAVENOUS at 09:42

## 2024-12-05 RX ADMIN — MAGNESIUM SULFATE HEPTAHYDRATE 2000 MG: 40 INJECTION, SOLUTION INTRAVENOUS at 09:40

## 2024-12-05 RX ADMIN — POTASSIUM BICARBONATE 40 MEQ: 391 TABLET, EFFERVESCENT ORAL at 09:24

## 2024-12-05 RX ADMIN — CARVEDILOL 3.12 MG: 6.25 TABLET, FILM COATED ORAL at 09:23

## 2024-12-05 RX ADMIN — SODIUM CHLORIDE, PRESERVATIVE FREE 10 ML: 5 INJECTION INTRAVENOUS at 20:16

## 2024-12-05 RX ADMIN — MIDODRINE HYDROCHLORIDE 5 MG: 5 TABLET ORAL at 09:24

## 2024-12-05 RX ADMIN — MIDODRINE HYDROCHLORIDE 5 MG: 5 TABLET ORAL at 13:25

## 2024-12-05 RX ADMIN — CARVEDILOL 3.12 MG: 6.25 TABLET, FILM COATED ORAL at 17:23

## 2024-12-05 RX ADMIN — CEFTRIAXONE SODIUM 2000 MG: 2 INJECTION, POWDER, FOR SOLUTION INTRAMUSCULAR; INTRAVENOUS at 10:55

## 2024-12-05 RX ADMIN — MIDODRINE HYDROCHLORIDE 5 MG: 5 TABLET ORAL at 17:23

## 2024-12-05 RX ADMIN — ENOXAPARIN SODIUM 40 MG: 100 INJECTION SUBCUTANEOUS at 09:36

## 2024-12-05 ASSESSMENT — PAIN - FUNCTIONAL ASSESSMENT: PAIN_FUNCTIONAL_ASSESSMENT: ACTIVITIES ARE NOT PREVENTED

## 2024-12-05 ASSESSMENT — PAIN DESCRIPTION - DESCRIPTORS: DESCRIPTORS: ACHING

## 2024-12-05 ASSESSMENT — PAIN DESCRIPTION - LOCATION: LOCATION: BACK

## 2024-12-05 ASSESSMENT — PAIN DESCRIPTION - ORIENTATION: ORIENTATION: MID;LOWER

## 2024-12-05 ASSESSMENT — PAIN SCALES - GENERAL: PAINLEVEL_OUTOF10: 6

## 2024-12-05 NOTE — PROGRESS NOTES
Hospitalist Progress Note   Admit Date:  2024  4:41 PM   Name:  Corky Bradshaw   Age:  69 y.o.  Sex:  male  :  1955   MRN:  [id number]   Room:  [facility]    Presenting/Chief Complaint: Shaking     Reason(s) for Admission: Hyponatremia [E87.1]  Septicemia (HCC) [A41.9]     Hospital Course:   Corky Bradshaw is a 69 y.o. male with medical history of HIV (on Genvoya and Prezista), Hodgkin's lymphoma (in remission since ), neuropathy, hypertension,, cell carcinoma of right tongue and tonsil status post surgery (follows up with neurosurgery at [facility]), status post PEG tube admitted on  with sepsis secondary to UTI in view of lactic acidosis, leukocytosis.        Subjective & 24hr Events:   -  Patient seen and examined at bedside.  Patient resting well in bedside recliner, he is alert and awake, AOx3, on room air.  No reported fever, vomiting or diarrhea overnight.  All question answered to best my ability using .      ROS:  10 point review of system is negative except for what mentioned above.      Assessment & Plan:     Principal Problem:    Acute UTI    Severe sepsis (HCC)    Lactic acidosis    Leukocytosis    Bacteremia due to alpha Streptococcus  Plan: -  Patient continues to be hemodynamically stable.  Lactic acidosis has resolved.  IV fluids have been stopped on .  Blood culture positive for alpha Streptococcus.  ID on board, appreciate recommendations.  Repeat cultures from  negative to date.  2D echo unremarkable for any infective endocarditis.  Continue empiric IV cefepime, D2.  Urine culture negative to date.  RVP negative.  Leukocytosis has resolved.      Hyponatremia  Plan: -  Hyponatremia has resolved, sodium of 134 today.  Potassium 3.4, ordered for 2 more doses of potassium supplementation.  Magnesium 2.0.    Active Problems:    HIV (human immunodeficiency virus infection) (HCC)  Plan: Recent CD4 count of  U/L    Total Protein 5.7 (L) 6.3 - 8.2 g/dL    Albumin 2.1 (L) 3.2 - 4.6 g/dL    Globulin 3.7 (H) 2.3 - 3.5 g/dL    Albumin/Globulin Ratio 0.6 (L) 1.0 - 1.9     CBC with Auto Differential    Collection Time: 12/05/24  6:09 AM   Result Value Ref Range    WBC 7.7 4.3 - 11.1 K/uL    RBC 3.18 (L) 4.23 - 5.6 M/uL    Hemoglobin 8.7 (L) 13.6 - 17.2 g/dL    Hematocrit 25.5 (L) 41.1 - 50.3 %    MCV 80.2 (L) 82.0 - 102.0 FL    MCH 27.4 26.1 - 32.9 PG    MCHC 34.1 31.4 - 35.0 g/dL    RDW 14.7 (H) 11.9 - 14.6 %    Platelets 447 150 - 450 K/uL    MPV 9.2 (L) 9.4 - 12.3 FL    nRBC 0.00 0.0 - 0.2 K/uL    Differential Type AUTOMATED      Neutrophils % 75 43 - 78 %    Lymphocytes % 13 13 - 44 %    Monocytes % 9 4.0 - 12.0 %    Eosinophils % 2 0.5 - 7.8 %    Basophils % 0 0.0 - 2.0 %    Immature Granulocytes % 1 0.0 - 5.0 %    Neutrophils Absolute 5.8 1.7 - 8.2 K/UL    Lymphocytes Absolute 1.0 0.5 - 4.6 K/UL    Monocytes Absolute 0.7 0.1 - 1.3 K/UL    Eosinophils Absolute 0.2 0.0 - 0.8 K/UL    Basophils Absolute 0.0 0.0 - 0.2 K/UL    Immature Granulocytes Absolute 0.1 0.0 - 0.5 K/UL   Magnesium    Collection Time: 12/05/24  6:09 AM   Result Value Ref Range    Magnesium 2.0 1.8 - 2.4 mg/dL       Recent Labs     12/02/24  1659   COVID19 Not detected       Current Meds:  Current Facility-Administered Medications   Medication Dose Route Frequency    potassium bicarb-citric acid (EFFER-K) effervescent tablet 40 mEq  40 mEq Per G Tube every 2 hours    magnesium sulfate 2000 mg in 50 mL IVPB premix  2,000 mg IntraVENous Once    cefTRIAXone (ROCEPHIN) 2,000 mg in sodium chloride 0.9 % 50 mL IVPB (mini-bag)  2,000 mg IntraVENous Q24H    Darunavir SUSP 800 mg (Patient Supplied)  800 mg PEG Tube Daily with breakfast    And    ritonavir (NORVIR) packet 100 mg (Patient Supplied)  100 mg PEG Tube Daily with breakfast    And    dolutegravir sodium (TIVICAY) tablet 50 mg  (Patient Supplied)  50 mg PEG Tube Daily with breakfast

## 2024-12-05 NOTE — PROGRESS NOTES
Patient/caregivers speak Uzbek  as their preferred language for their healthcare communication. For safe communication, use the AMN  carts or call:    Senior  Navigator An Hoyt at [phone] or   AMN phone services for [facility] at [phone]    General phone: 225-[facility]8 ( [phone])  Email: [email or web address]    Always document the use of interpreting services ('s ID number) in your clinical notes.    Our interpreters are available for team members working with limited  English proficient (LEP) patients remotely, via phone or video or in person (if needed for special cases).    When using family members to interpret, for the safety of the patient and protection of the communication of both our patient and [facility] staff the VRI or telephonic  should remain on the line to monitor that all communication is accurate and complete. The  should be instructed to notify [facility] staff immediately if there are any inaccuracies.         Thank you,        An CARLOS  Senior /Navigator

## 2024-12-05 NOTE — PROGRESS NOTES
Chart reviewed. No iron, folate or B12 deficiency, Cu and Zn pending.  Nothing to add from hem perspective at this time.  Outpatient follow up in 3-4 weeks will be arranged.   Heme-onc will sign off.     Bran Cook MD  [facility]ematology [facility] On[facility]  [address] [place] [address]  Office : [phone]  Fax : [phone]  12/05/24

## 2024-12-05 NOTE — PROGRESS NOTES
Nutrition Assessment  Interpretor used for this encounter  Assessment Type: Reassess  Reason for visit:  Malnutrition Screening Tool: Malnutrition Screen  Have you recently lost weight without trying?: 24 to 33 pounds (3 points)  Have you been eating poorly because of a decreased appetite?: No (0 points)  Malnutrition Screening Tool Score: 3, Tube Feeding Management (Hospitalists), and Best Practice Alert for Current Home EN/PN  Malnutrition Screening Tool Score: 3    Nutrition Intervention:   Food and/or Nutrient Delivery:   Enteral Nutrition:   Enteral Access: PEG  Change  Formula: Other Tube Feeding (Ensure ENLIVE or PLUS))  Goal Rate: Bolus 237 ml times 5/day Suggested Feeding Times 0900, 1200, 1500, 1800, 2100   Change  Water flush  75 ml before and after each bolus  Modulars: None not indicated at this time   Enteral regimen at above goal to provide per 24 hours:  1750 calories, 100 grams protein and 1650 ml free fluid.  Additional water provided with medication administration  Above regimen: Intended to meet macronutrient goals  IV Fluids:  Not applicable  Labs:   EN labs: BMP active per MD. POC Glucoses/SSI Not indicated  Nutrition Related Medication Management:  Electrolyte Replacement Protocol PRN Continue for Magnesium and Potassium. Phosphorus replacement to be addressed individually secondary to national shortages.    Thiamine Not indicated  Bowel Regimen Active prn  Meals and Snacks:  Diet: Continue current order/NPO       Malnutrition Assessment:  Malnutrition Status: At risk for malnutrition (Severe weight loss but now with trend of weight gain, adeqaute intake from TF PTA)     Nutrition Focused Physical Exam, visual: Only remarkable for mild fat loss of triceps  Nutrition Assessment:  Food/Nutrition Related History:    Per EMR 10/31: MBS with recommendations for full liquid diet. 11/15: MBS with SLP recommending that pt be NPO. PEG placed 11/18  Hx from pt and wife. After discharge from [facility] post

## 2024-12-05 NOTE — CARE COORDINATION
Patient will possibly DC tomorrow, likely will need oral antibiotics.  Per Raj at [facility], they request RNCM to call and confirm DC to ship tube feeding formula to patient.  Patient has been set up with interim HH to provide follow up tube feed education post hospital DC.    Unknown

## 2024-12-05 NOTE — PROGRESS NOTES
ACUTE PHYSICAL THERAPY GOALS:   (Developed with and agreed upon by patient and/or caregiver.)    LTG:  (1.)Mr. Bradshaw will move from supine to sit and sit to supine  in bed with INDEPENDENT within 7 treatment day(s).    (2.)Mr. Bradshaw will transfer from bed to chair and chair to bed with SUPERVISION using the least restrictive device within 7 treatment day(s).    (3.)Mr. Bradshaw will ambulate with SUPERVISION for 650 feet with the least restrictive device within 7 treatment day(s).    All goals above met this date 12/5/2024    ________________________________________________________________________________________________      PHYSICAL THERAPY Daily Note and AM  (Link to Caseload Tracking: PT Visit Days : 3  Acknowledge Orders  Time In/Out  PT Charge Capture  Rehab Caseload Tracker    Corky Bradshaw is a 69 y.o. male   PRIMARY DIAGNOSIS: Septicemia (HCC)  Hyponatremia [E87.1]  Septicemia (HCC) [A41.9]       Reason for Referral: Generalized Muscle Weakness (M62.81)  Other abnormalities of gait and mobility (R26.89)  Inpatient: Payor: MEDICARE / Plan: MEDICARE PART A AND B / Product Type: *No Product type* /     ASSESSMENT:     REHAB RECOMMENDATIONS:   Recommendation to date pending progress:  Setting:  No further skilled physical therapy after discharge from hospital    Equipment:    None     ASSESSMENT:  Mr. Bradshaw presents with general weakness and decreased functional mobility admitted with above diagnosis.  He lives with his wife and family and is normally independent and drives.  He did have surgery 2 weeks ago for a pharyngeal mass.      This afternoon, he was agreeable.  He is alert.  He is making conversation, sometimes difficult to understand.  He transferred and ambulated SBA in the renteria and did well.  He used restroom on his own in sitting.  He stood at sink for clean up.  He settled in chair and was comfortable. He would benefit from continued PT while he is here and hopefully will progress so that he

## 2024-12-06 VITALS
HEART RATE: 77 BPM | BODY MASS INDEX: 28.71 KG/M2 | DIASTOLIC BLOOD PRESSURE: 51 MMHG | RESPIRATION RATE: 16 BRPM | TEMPERATURE: 98.6 F | OXYGEN SATURATION: 96 % | SYSTOLIC BLOOD PRESSURE: 95 MMHG | WEIGHT: 156 LBS | HEIGHT: 62 IN

## 2024-12-06 LAB
ANION GAP SERPL CALC-SCNC: 11 MMOL/L (ref 7–16)
BACTERIA SPEC CULT: ABNORMAL
BUN SERPL-MCNC: 16 MG/DL (ref 8–23)
CALCIUM SERPL-MCNC: 9 MG/DL (ref 8.8–10.2)
CHLORIDE SERPL-SCNC: 91 MMOL/L (ref 98–107)
CO2 SERPL-SCNC: 29 MMOL/L (ref 20–29)
CREAT SERPL-MCNC: 0.77 MG/DL (ref 0.8–1.3)
GLUCOSE SERPL-MCNC: 123 MG/DL (ref 70–99)
GRAM STN SPEC: ABNORMAL
POTASSIUM SERPL-SCNC: 3.9 MMOL/L (ref 3.5–5.1)
SERVICE CMNT-IMP: ABNORMAL
SERVICE CMNT-IMP: ABNORMAL
SODIUM SERPL-SCNC: 131 MMOL/L (ref 136–145)

## 2024-12-06 PROCEDURE — 80048 BASIC METABOLIC PNL TOTAL CA: CPT

## 2024-12-06 PROCEDURE — 6370000000 HC RX 637 (ALT 250 FOR IP): Performed by: HOSPITALIST

## 2024-12-06 PROCEDURE — 6370000000 HC RX 637 (ALT 250 FOR IP): Performed by: FAMILY MEDICINE

## 2024-12-06 PROCEDURE — 6360000002 HC RX W HCPCS: Performed by: FAMILY MEDICINE

## 2024-12-06 PROCEDURE — 6370000000 HC RX 637 (ALT 250 FOR IP): Performed by: INTERNAL MEDICINE

## 2024-12-06 PROCEDURE — 2580000003 HC RX 258: Performed by: FAMILY MEDICINE

## 2024-12-06 PROCEDURE — 6360000002 HC RX W HCPCS: Performed by: INTERNAL MEDICINE

## 2024-12-06 PROCEDURE — 36415 COLL VENOUS BLD VENIPUNCTURE: CPT

## 2024-12-06 PROCEDURE — 2580000003 HC RX 258: Performed by: INTERNAL MEDICINE

## 2024-12-06 RX ORDER — TRAMADOL HYDROCHLORIDE 50 MG/1
50 TABLET ORAL EVERY 6 HOURS PRN
Status: DISCONTINUED | OUTPATIENT
Start: 2024-12-06 | End: 2024-12-06 | Stop reason: HOSPADM

## 2024-12-06 RX ORDER — AMOXICILLIN AND CLAVULANATE POTASSIUM 250; 62.5 MG/5ML; MG/5ML
500 POWDER, FOR SUSPENSION ORAL EVERY 8 HOURS
Qty: 840 ML | Refills: 0 | Status: SHIPPED | OUTPATIENT
Start: 2024-12-07 | End: 2025-01-04

## 2024-12-06 RX ORDER — SULFAMETHOXAZOLE AND TRIMETHOPRIM 200; 40 MG/5ML; MG/5ML
160 SUSPENSION ORAL
Qty: 480 ML | Refills: 3 | Status: SHIPPED | OUTPATIENT
Start: 2024-12-09 | End: 2025-02-07

## 2024-12-06 RX ORDER — CHLORHEXIDINE GLUCONATE 40 MG/ML
SOLUTION TOPICAL DAILY PRN
Status: DISCONTINUED | OUTPATIENT
Start: 2024-12-06 | End: 2024-12-06 | Stop reason: HOSPADM

## 2024-12-06 RX ORDER — MIDODRINE HYDROCHLORIDE 5 MG/1
5 TABLET ORAL 3 TIMES DAILY
Qty: 90 TABLET | Refills: 0 | Status: SHIPPED | OUTPATIENT
Start: 2024-12-06 | End: 2025-01-05

## 2024-12-06 RX ORDER — TRAMADOL HYDROCHLORIDE 50 MG/1
50 TABLET ORAL EVERY 6 HOURS PRN
Qty: 56 TABLET | Refills: 0 | Status: SHIPPED | OUTPATIENT
Start: 2024-12-06 | End: 2024-12-20

## 2024-12-06 RX ORDER — AMOXICILLIN AND CLAVULANATE POTASSIUM 250; 62.5 MG/5ML; MG/5ML
500 POWDER, FOR SUSPENSION ORAL EVERY 8 HOURS
Status: DISCONTINUED | OUTPATIENT
Start: 2024-12-07 | End: 2024-12-06 | Stop reason: HOSPADM

## 2024-12-06 RX ORDER — MIDODRINE HYDROCHLORIDE 5 MG/1
10 TABLET ORAL
Status: DISCONTINUED | OUTPATIENT
Start: 2024-12-06 | End: 2024-12-06 | Stop reason: HOSPADM

## 2024-12-06 RX ADMIN — ENOXAPARIN SODIUM 40 MG: 100 INJECTION SUBCUTANEOUS at 09:04

## 2024-12-06 RX ADMIN — MIDODRINE HYDROCHLORIDE 10 MG: 5 TABLET ORAL at 13:52

## 2024-12-06 RX ADMIN — MIDODRINE HYDROCHLORIDE 10 MG: 5 TABLET ORAL at 09:03

## 2024-12-06 RX ADMIN — CEFTRIAXONE SODIUM 2000 MG: 2 INJECTION, POWDER, FOR SOLUTION INTRAMUSCULAR; INTRAVENOUS at 09:38

## 2024-12-06 RX ADMIN — SODIUM CHLORIDE, PRESERVATIVE FREE 10 ML: 5 INJECTION INTRAVENOUS at 09:05

## 2024-12-06 RX ADMIN — TRAMADOL HYDROCHLORIDE 50 MG: 50 TABLET ORAL at 04:26

## 2024-12-06 RX ADMIN — SULFAMETHOXAZOLE AND TRIMETHOPRIM 20 ML: 200; 40 SUSPENSION ORAL at 09:03

## 2024-12-06 ASSESSMENT — PAIN DESCRIPTION - LOCATION: LOCATION: BACK

## 2024-12-06 ASSESSMENT — PAIN DESCRIPTION - DESCRIPTORS: DESCRIPTORS: SORE;ACHING

## 2024-12-06 ASSESSMENT — PAIN SCALES - GENERAL
PAINLEVEL_OUTOF10: 10
PAINLEVEL_OUTOF10: 0

## 2024-12-06 ASSESSMENT — PAIN DESCRIPTION - ORIENTATION: ORIENTATION: MID;LOWER

## 2024-12-06 ASSESSMENT — PAIN - FUNCTIONAL ASSESSMENT: PAIN_FUNCTIONAL_ASSESSMENT: ACTIVITIES ARE NOT PREVENTED

## 2024-12-06 NOTE — DISCHARGE SUMMARY
Collected: 12/02/24 1709    Order Status: Completed Specimen: Blood Updated: 12/03/24 1548     Accession Number [id number]     Enterococcus faecalis by PCR NOT DETECTED        Enterococcus faecium by PCR NOT DETECTED        Listeria monocytogenes by PCR NOT DETECTED        STAPHYLOCOCCUS NOT DETECTED        Staphylococcus Aureus NOT DETECTED        Staphylococcus epidermidis by PCR NOT DETECTED        Staphylococcus lugdunensis by PCR NOT DETECTED        STREPTOCOCCUS Detected        Comment: RESULTS VERIFIED, PHONED TO AND READ BACK BY  WALDO VALVERDE RN @ 9465 ON 12.3.24 BERENICE          Streptococcus agalactiae (Group B) NOT DETECTED        Strep pneumoniae NOT DETECTED        Strep pyogenes,(Grp. A) NOT DETECTED        Acinetobacter calcoac baumannii complex by PCR NOT DETECTED        Bacteroides fragilis by PCR NOT DETECTED        Enterobacteriaceae by PCR NOT DETECTED        Enterobacter cloacae complex by PCR NOT DETECTED        Escherichia Coli NOT DETECTED        Klebsiella aerogenes by PCR NOT DETECTED        Klebsiella oxytoca by PCR NOT DETECTED        Klebsiella pneumoniae group by PCR NOT DETECTED        Proteus by PCR NOT DETECTED        Salmonella species by PCR NOT DETECTED        Serratia marcescens by PCR NOT DETECTED        Haemophilus Influenzae by PCR NOT DETECTED        Neisseria meningitidis by PCR NOT DETECTED        Pseudomonas aeruginosa NOT DETECTED        Stenotrophomonas maltophilia by PCR NOT DETECTED        Candida albicans by PCR NOT DETECTED        Candida auris by PCR NOT DETECTED        Candida glabrata NOT DETECTED        Candida krusei by PCR NOT DETECTED        Candida parapsilosis by PCR NOT DETECTED        Candida tropicalis by PCR NOT DETECTED        Cryptococcus neoformans/gattii by PCR NOT DETECTED        Resistant gene targets          [employer] test comment       False positive results may rarely occur. Correlate with clinical,epidemiologic, and other laboratory findings            Comment: Please see BC[facility] Interpretation Guide in EPIC Links       Culture, Blood 1 [[id number]]  (Abnormal) Collected: 12/02/24 1659    Order Status: Completed Specimen: Blood Updated: 12/06/24 0733     Special Requests --        RIGHT  Antecubital       Gram Stain Gram positive cocci               AEROBIC AND ANAEROBIC BOTTLES                  CRITICAL RESULT NOT CALLED DUE TO PREVIOUS NOTIFICATION OF CRITICAL RESULT WITHIN THE LAST 24 HOURS.           Culture       ALPHA STREPTOCOCCUS CULTURE IN PROGRESS,FURTHER UPDATES TO FOLLOW          COVID-19, Rapid [[id number]] Collected: 12/02/24 1659    Order Status: Completed Specimen: Nasopharyngeal Updated: 12/02/24 1729     Source Nasopharyngeal        SARS-CoV-2, Rapid Not detected        Comment:    The specimen is NEGATIVE for SARS-CoV-2, the novel coronavirus associated with COVID-19.  A negative result does not rule out COVID-19.     This test has been authorized by the FDA under an Emergency Use Authorization (EUA) for use by authorized laboratories.      Fact sheet for Healthcare Providers: https://www.fda.gov/media/563745/download  Fact sheet for Patients: https://www.fda.gov/media/082648/download     Methodology: Isothermal Nucleic Acid Amplification                 All Labs from Last 24 Hrs:  Recent Results (from the past 24 hour(s))   Basic Metabolic Panel w/ Reflex to MG    Collection Time: 12/06/24  8:46 AM   Result Value Ref Range    Sodium 131 (L) 136 - 145 mmol/L    Potassium 3.9 3.5 - 5.1 mmol/L    Chloride 91 (L) 98 - 107 mmol/L    CO2 29 20 - 29 mmol/L    Anion Gap 11 7 - 16 mmol/L    Glucose 123 (H) 70 - 99 mg/dL    BUN 16 8 - 23 MG/DL    Creatinine 0.77 (L) 0.80 - 1.30 MG/DL    Est, Glom Filt Rate >90 >60 ml/min/1.73m2    Calcium 9.0 8.8 - 10.2 MG/DL       No results for input(s): \"COVID19\" in the last 72 hours.    Recent Vital Data:  Patient Vitals for the past 24 hrs:   Temp Pulse Resp BP SpO2   12/06/24 0726 98.6 °F (37 °C) 77 16 (!) 95/51

## 2024-12-06 NOTE — PROGRESS NOTES
Patient/caregivers speak Citizen of [place]  as their preferred language for their healthcare communication. For safe communication, use the AMN  carts or call:    Senior  Navigator An Hoyt at [phone] or   AMN phone services for [facility] at [phone]    General phone: [phone] ( [phone])  Email: [email or web address]    Always document the use of interpreting services ('s ID number) in your clinical notes.    Our interpreters are available for team members working with limited  English proficient (LEP) patients remotely, via phone or video or in person (if needed for special cases).    When using family members to interpret, for the safety of the patient and protection of the communication of both our patient and [facility] staff the VRI or telephonic  should remain on the line to monitor that all communication is accurate and complete. The  should be instructed to notify [facility] staff immediately if there are any inaccuracies.         Thank you,        An CARLOS  Senior /Navigator

## 2024-12-06 NOTE — CARE COORDINATION
Patient with discharge orders for today.  [place] to reach out to patient to deliver tube feeding formula.  RNCM also set up interim home health: SN to follow up on tube feeding management and education. No additional needs made known to CM. Patient has met all treatment goals and milestones for discharge. Family to provide transportation home. CM following until patient is discharged.        12/06/24 2104   Services At/After Discharge   Transition of Care Consult (CM Consult) Home Health   Internal Home Health No   Services At/After Discharge Home Health   [place] Resource Information Provided? No   Mode of Transport at Discharge Other (see comment)  (Family)   Confirm Follow Up Transport Family   Condition of Participation: Discharge Planning   The Plan for Transition of Care is related to the following treatment goals: Patient to DC home with home health and return to baseline function.   The Patient and/or Patient Representative was provided with a Choice of Provider? Patient   The Patient and/Or Patient Representative agree with the Discharge Plan? Yes   Freedom of Choice list was provided with basic dialogue that supports the patient's individualized plan of care/goals, treatment preferences, and shares the quality data associated with the providers?  Yes

## 2024-12-06 NOTE — PROGRESS NOTES
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  Continue HIV meds.  Pt has appointment at [facility] care on 24 and he should keep this.  He reports back pain on admission that has resolved.  No neuro deficits.  I don't think it's worth it to pursue MRI today, but if back pain recurs, then would plan MRI.    Anti-infectives:   IV ceftriaxone    Subjective:   Interval history: Doing well; ready for discharge; seen with     Allergies   Allergen Reactions    Efavirenz Other (See Comments)        Review of Systems:  A comprehensive review of systems is negative except as stated above.      Objective:     Visit Vitals  BP (!) 95/51   Pulse 77   Temp 98.6 °F (37 °C) (Oral)   Resp 16   Ht 1.575 m (5' 2\")   Wt 70.8 kg (156 lb)   SpO2 96%   BMI 28.53 kg/m²     Temp (24hrs), Av.2 °F (36.8 °C), Min:97.7 °F (36.5 °C), Max:98.6 °F (37 °C)       Lines:    Gastrostomy/Enterostomy/Jejunostomy Tube LUQ (Active)        Physical Exam:    General:  NAD  CV:     Lungs:  Breathing comfortably on room air  Abdomen:  PEG in place  Extremities: No cyanosis or clubbing, no edema  Skin:   No rashes, normal coloration, warm and dry  Psych:  Normal mood and affect    Data Review:   I have personally reviewed labs, micro, and other relevant tests:    Labs: Results:       BMP, Mg, Phos Recent Labs     24  0552 24  0609 24  0846   * 134* 131*   K 2.9* 3.4* 3.9  12/3/24           Culture       ALPHA STREPTOCOCCUS CULTURE IN PROGRESS,FURTHER UPDATES TO FOLLOW                  Refer to Blood Culture ID Panel Accession [id number]          Culture, Blood, PCR ID Panel [[id number]]  (Abnormal) Collected: 12/02/24 1709    Order Status: Completed Specimen: Blood Updated: 12/03/24 1541     Accession Number [id number]     Enterococcus faecalis by PCR NOT DETECTED        Enterococcus faecium by PCR NOT DETECTED        Listeria monocytogenes by PCR NOT DETECTED        STAPHYLOCOCCUS NOT DETECTED        Staphylococcus Aureus NOT DETECTED        Staphylococcus epidermidis by PCR NOT DETECTED        Staphylococcus lugdunensis by PCR NOT DETECTED        STREPTOCOCCUS Detected        Comment: RESULTS VERIFIED, PHONED TO AND READ BACK BY  WALDO VALVERDE RN @ 2334 ON 12.3.24 MANUEL          Streptococcus agalactiae (Group B) NOT DETECTED        Strep pneumoniae NOT DETECTED        Strep pyogenes,(Grp. A) NOT DETECTED        Acinetobacter calcoac baumannii complex by PCR NOT DETECTED        Bacteroides fragilis by PCR NOT DETECTED        Enterobacteriaceae by PCR NOT DETECTED        Enterobacter cloacae complex by PCR NOT DETECTED        Escherichia Coli NOT DETECTED        Klebsiella aerogenes by PCR NOT DETECTED        Klebsiella oxytoca by PCR NOT DETECTED        Klebsiella pneumoniae group by PCR NOT DETECTED        Proteus by PCR NOT DETECTED        Salmonella species by PCR NOT DETECTED        Serratia marcescens by PCR NOT DETECTED        Haemophilus Influenzae by PCR NOT DETECTED        Neisseria meningitidis by PCR NOT DETECTED        Pseudomonas aeruginosa NOT DETECTED        Stenotrophomonas maltophilia by PCR NOT DETECTED        Candida albicans by PCR NOT DETECTED        Candida auris by PCR NOT DETECTED        Candida glabrata NOT DETECTED        Candida krusei by PCR NOT DETECTED        Candida parapsilosis by PCR NOT DETECTED        Candida tropicalis by PCR NOT DETECTED

## 2024-12-06 NOTE — PLAN OF CARE
Problem: Discharge Planning  Goal: Discharge to home or other facility with appropriate resources  12/6/2024 1517 by Phoebe Kraus RN  Outcome: Progressing  12/6/2024 0234 by Michelle Chavez RN  Outcome: Progressing     Problem: Pain  Goal: Verbalizes/displays adequate comfort level or baseline comfort level  12/6/2024 1517 by Phoebe Kraus RN  Outcome: Progressing  12/6/2024 0234 by Michelle Chavez RN  Outcome: Progressing     Problem: ABCDS Injury Assessment  Goal: Absence of physical injury  12/6/2024 1517 by Phoebe Kraus RN  Outcome: Progressing  12/6/2024 0234 by Michelle Chavez RN  Outcome: Progressing     Problem: Safety - Adult  Goal: Free from fall injury  12/6/2024 1517 by Phoebe Kraus RN  Outcome: Progressing  12/6/2024 0234 by Michelle Chavez RN  Outcome: Progressing

## 2024-12-08 LAB
BACTERIA SPEC CULT: NORMAL
BACTERIA SPEC CULT: NORMAL
SERVICE CMNT-IMP: NORMAL
SERVICE CMNT-IMP: NORMAL

## 2024-12-09 LAB
BACTERIA SPEC CULT: ABNORMAL
BACTERIA SPEC CULT: NORMAL
BACTERIA SPEC CULT: NORMAL
GRAM STN SPEC: ABNORMAL
SERVICE CMNT-IMP: ABNORMAL
SERVICE CMNT-IMP: NORMAL
SERVICE CMNT-IMP: NORMAL

## 2024-12-11 ENCOUNTER — TRANSCRIBE ORDERS (OUTPATIENT)
Dept: SCHEDULING | Age: 69
End: 2024-12-11

## 2024-12-11 DIAGNOSIS — C01 MALIGNANT NEOPLASM OF BASE OF TONGUE (HCC): ICD-10-CM

## 2024-12-11 DIAGNOSIS — C10.9 OROPHARYNGEAL CANCER (HCC): Primary | ICD-10-CM

## 2024-12-13 LAB
ANTIMICROBIAL SUSCEPTIBILITY: ABNORMAL
ANTIMICROBIAL SUSCEPTIBILITY: ABNORMAL
BACTERIA ISLT: ABNORMAL
BACTERIA SPEC CULT: ABNORMAL
GRAM STN SPEC: ABNORMAL
SERVICE CMNT-IMP: ABNORMAL
SPECIMEN SOURCE: ABNORMAL
SPECIMEN SOURCE: ABNORMAL

## 2024-12-13 NOTE — ED NOTES
Pts chart opened for FU calls with CM. Pt discharged to SNF.      Jennifer Bolivar, RN  12/13/24 5539

## 2024-12-17 ENCOUNTER — TRANSCRIBE ORDERS (OUTPATIENT)
Dept: PHYSICAL THERAPY | Age: 69
End: 2024-12-17

## 2024-12-17 DIAGNOSIS — C10.9 OROPHARYNGEAL CARCINOMA (HCC): Primary | ICD-10-CM

## 2024-12-17 DIAGNOSIS — R13.10 DYSPHAGIA, UNSPECIFIED TYPE: ICD-10-CM

## 2025-01-08 ENCOUNTER — HOSPITAL ENCOUNTER (OUTPATIENT)
Dept: PET IMAGING | Age: 70
Discharge: HOME | End: 2025-01-11
Payer: MEDICARE

## 2025-01-08 DIAGNOSIS — C10.9 OROPHARYNGEAL CANCER (HCC): ICD-10-CM

## 2025-01-08 DIAGNOSIS — C01 MALIGNANT NEOPLASM OF BASE OF TONGUE (HCC): ICD-10-CM

## 2025-01-08 LAB
GLUCOSE BLD STRIP.AUTO-MCNC: 106 MG/DL (ref 65–100)
SERVICE CMNT-IMP: ABNORMAL

## 2025-01-08 PROCEDURE — 3430000000 HC RX DIAGNOSTIC RADIOPHARMACEUTICAL: Performed by: PHYSICIAN ASSISTANT

## 2025-01-08 PROCEDURE — 6360000004 HC RX CONTRAST MEDICATION: Performed by: PHYSICIAN ASSISTANT

## 2025-01-08 PROCEDURE — 2500000003 HC RX 250 WO HCPCS: Performed by: PHYSICIAN ASSISTANT

## 2025-01-08 PROCEDURE — A9609 HC RX DIAGNOSTIC RADIOPHARMACEUTICAL: HCPCS | Performed by: PHYSICIAN ASSISTANT

## 2025-01-08 PROCEDURE — 78815 PET IMAGE W/CT SKULL-THIGH: CPT

## 2025-01-08 PROCEDURE — 82962 GLUCOSE BLOOD TEST: CPT

## 2025-01-08 RX ORDER — SODIUM CHLORIDE 0.9 % (FLUSH) 0.9 %
20 SYRINGE (ML) INJECTION AS NEEDED
Status: DISCONTINUED | OUTPATIENT
Start: 2025-01-08 | End: 2025-01-12 | Stop reason: HOSPADM

## 2025-01-08 RX ORDER — DIATRIZOATE MEGLUMINE AND DIATRIZOATE SODIUM 660; 100 MG/ML; MG/ML
10 SOLUTION ORAL; RECTAL
Status: DISCONTINUED | OUTPATIENT
Start: 2025-01-08 | End: 2025-01-12 | Stop reason: HOSPADM

## 2025-01-08 RX ORDER — FLUDEOXYGLUCOSE F 18 200 MCI/ML
13.25 INJECTION, SOLUTION INTRAVENOUS
Status: COMPLETED | OUTPATIENT
Start: 2025-01-08 | End: 2025-01-08

## 2025-01-08 RX ADMIN — DIATRIZOATE MEGLUMINE AND DIATRIZOATE SODIUM 10 ML: 660; 100 LIQUID ORAL; RECTAL at 09:44

## 2025-01-08 RX ADMIN — SODIUM CHLORIDE, PRESERVATIVE FREE 20 ML: 5 INJECTION INTRAVENOUS at 09:44

## 2025-01-08 RX ADMIN — FLUDEOXYGLUCOSE F 18 13.25 MILLICURIE: 200 INJECTION, SOLUTION INTRAVENOUS at 09:44

## 2025-01-29 ENCOUNTER — HOSPITAL ENCOUNTER (OUTPATIENT)
Dept: GENERAL RADIOLOGY | Age: 70
Discharge: HOME | End: 2025-02-01
Payer: MEDICARE

## 2025-01-29 DIAGNOSIS — C10.9 OROPHARYNGEAL CARCINOMA (HCC): ICD-10-CM

## 2025-01-29 DIAGNOSIS — R13.10 DYSPHAGIA, UNSPECIFIED TYPE: ICD-10-CM

## 2025-01-29 PROCEDURE — 2500000003 HC RX 250 WO HCPCS: Performed by: PHYSICIAN ASSISTANT

## 2025-01-29 PROCEDURE — 74230 X-RAY XM SWLNG FUNCJ C+: CPT

## 2025-01-29 PROCEDURE — 92611 MOTION FLUOROSCOPY/SWALLOW: CPT

## 2025-01-29 RX ADMIN — BARIUM SULFATE 30 ML: 0.81 POWDER, FOR SUSPENSION ORAL at 13:20

## 2025-01-29 RX ADMIN — BARIUM SULFATE 15 ML: 400 PASTE ORAL at 13:20

## 2025-01-29 NOTE — PROGRESS NOTES
complications.    Education:   Patient educated on Results of evaluation, Role of speech therapy, Diet recommendations, SLP recommendations, and SLP plan  Education provided to Patient  Education response: Verbalizes understanding    Safety:   Patient escorted back to waiting area and no additional needs or questions presented when inquired by clinician.     Therapy Time:  Time In: 1300  Time Out: 1330  Minutes: 30    SMITA GRIJALVA  1/29/2025 2:06 PM

## 2025-04-16 ENCOUNTER — TRANSCRIBE ORDERS (OUTPATIENT)
Dept: SCHEDULING | Age: 70
End: 2025-04-16

## 2025-04-16 DIAGNOSIS — C10.9 OROPHARYNGEAL CANCER (HCC): Primary | ICD-10-CM

## 2025-04-16 DIAGNOSIS — R25.2 TRISMUS: ICD-10-CM

## 2025-04-16 DIAGNOSIS — R13.12 DYSPHAGIA, OROPHARYNGEAL PHASE: ICD-10-CM

## 2025-05-28 ENCOUNTER — TRANSCRIBE ORDERS (OUTPATIENT)
Dept: PHYSICAL THERAPY | Age: 70
End: 2025-05-28

## 2025-05-28 DIAGNOSIS — C10.9 OROPHARYNGEAL CARCINOMA (HCC): Primary | ICD-10-CM

## 2025-05-28 DIAGNOSIS — R13.12 OROPHARYNGEAL DYSPHAGIA: ICD-10-CM

## 2025-05-29 ENCOUNTER — HOSPITAL ENCOUNTER (OUTPATIENT)
Dept: GENERAL RADIOLOGY | Age: 70
Discharge: HOME | End: 2025-05-31
Payer: MEDICARE

## 2025-05-29 DIAGNOSIS — R13.12 OROPHARYNGEAL DYSPHAGIA: ICD-10-CM

## 2025-05-29 DIAGNOSIS — C10.9 OROPHARYNGEAL CARCINOMA (HCC): ICD-10-CM

## 2025-05-29 PROCEDURE — 2500000003 HC RX 250 WO HCPCS: Performed by: PHYSICIAN ASSISTANT

## 2025-05-29 PROCEDURE — 92611 MOTION FLUOROSCOPY/SWALLOW: CPT

## 2025-05-29 PROCEDURE — 74230 X-RAY XM SWLNG FUNCJ C+: CPT

## 2025-05-29 RX ADMIN — BARIUM SULFATE 60 ML: 0.81 POWDER, FOR SUSPENSION ORAL at 09:25

## 2025-05-29 RX ADMIN — BARIUM SULFATE 15 ML: 400 PASTE ORAL at 09:25

## 2025-05-29 NOTE — PROGRESS NOTES
Corky Bradshaw  : 1955  Primary: Medicare Part A And B  Secondary:   MRN: [id number] [facility]  [address] [place] [address]  Phone: [phone]    Visit Info:    Date 2025   SPEECH LANGUAGE PATHOLOGY:   MODIFIED BARIUM SWALLOW STUDY     Appt Desk   Episode      Treatment Diagnosis:   Oropharyngeal carcinoma (HCC)  Oropharyngeal dysphagia    Medical/Referring Diagnosis:  Oropharyngeal carcinoma (HCC) [C10.9]  Oropharyngeal dysphagia [R13.12]  Referring Physician:  Caroline Rios PA  Radiologist: Dr. Cardoso  Fluoroscopy completed by: Dr. Janae BRIGGS Orders: Modified Barium Swallow  Date of Onset:  No data recorded   Allergies:  Efavirenz    PAST MEDICAL HISTORY:   Mr. Bradshaw is a 69 y.o. male who  has no past medical history on file.  He also  has no past surgical history on file.    ASSESSMENT/PLAN OF CARE   Based on the objective data described below, Mr. Bradshaw presents with moderate-severe oral and mild pharyngeal dysphagia.     Adequate acceptance and bolus containment with all trials. Reduced bolus formation and propulsion which was most apparent with puree textures.  Repeated tongue pumping and repeated propulsion attempts with slight spillage to vallecula prior to initiation of swallow. Initial swallow of puree occurred approximately 90 seconds after presentation, but total of 10 swallows observed before adequate oral clearance was achieved with single tsp trial. Liquid wash was most effective in clearing oral cavity and triggering swallow. Inconsistent penetration occurring during the swallow with thin liquids, but spontaneously cleared. Patient requested trials of soft solids which had similar oral prep and timing as puree trials. No significant pharyngeal residue after thin liquids, puree, applesauce, or soft solid trial.     Patient demonstrates improvement in pharyngeal swallow function compared to prior study in 2025. However, his swallow